# Patient Record
Sex: FEMALE | Race: WHITE | NOT HISPANIC OR LATINO | Employment: PART TIME | URBAN - METROPOLITAN AREA
[De-identification: names, ages, dates, MRNs, and addresses within clinical notes are randomized per-mention and may not be internally consistent; named-entity substitution may affect disease eponyms.]

---

## 2023-02-23 LAB
ALANINE AMINOTRANSFERASE (SGPT) (U/L) IN SER/PLAS: 24 U/L (ref 7–45)
ALBUMIN (G/DL) IN SER/PLAS: 4.4 G/DL (ref 3.4–5)
ALKALINE PHOSPHATASE (U/L) IN SER/PLAS: 54 U/L (ref 33–110)
ANION GAP IN SER/PLAS: 12 MMOL/L (ref 10–20)
APPEARANCE, URINE: CLEAR
ASPARTATE AMINOTRANSFERASE (SGOT) (U/L) IN SER/PLAS: 21 U/L (ref 9–39)
BASOPHILS (10*3/UL) IN BLOOD BY AUTOMATED COUNT: 0.05 X10E9/L (ref 0–0.1)
BASOPHILS/100 LEUKOCYTES IN BLOOD BY AUTOMATED COUNT: 0.7 % (ref 0–2)
BILIRUBIN TOTAL (MG/DL) IN SER/PLAS: 0.4 MG/DL (ref 0–1.2)
BILIRUBIN, URINE: NEGATIVE
BLOOD, URINE: NEGATIVE
CALCIDIOL (25 OH VITAMIN D3) (NG/ML) IN SER/PLAS: 51 NG/ML
CALCIUM (MG/DL) IN SER/PLAS: 9.1 MG/DL (ref 8.6–10.3)
CARBON DIOXIDE, TOTAL (MMOL/L) IN SER/PLAS: 28 MMOL/L (ref 21–32)
CHLORIDE (MMOL/L) IN SER/PLAS: 103 MMOL/L (ref 98–107)
CHOLESTEROL (MG/DL) IN SER/PLAS: 218 MG/DL (ref 0–199)
CHOLESTEROL IN HDL (MG/DL) IN SER/PLAS: 68 MG/DL
CHOLESTEROL/HDL RATIO: 3.2
COLOR, URINE: NORMAL
CREATININE (MG/DL) IN SER/PLAS: 0.71 MG/DL (ref 0.5–1.05)
EOSINOPHILS (10*3/UL) IN BLOOD BY AUTOMATED COUNT: 0.07 X10E9/L (ref 0–0.7)
EOSINOPHILS/100 LEUKOCYTES IN BLOOD BY AUTOMATED COUNT: 1 % (ref 0–6)
ERYTHROCYTE DISTRIBUTION WIDTH (RATIO) BY AUTOMATED COUNT: 13.3 % (ref 11.5–14.5)
ERYTHROCYTE MEAN CORPUSCULAR HEMOGLOBIN CONCENTRATION (G/DL) BY AUTOMATED: 33.6 G/DL (ref 32–36)
ERYTHROCYTE MEAN CORPUSCULAR VOLUME (FL) BY AUTOMATED COUNT: 95 FL (ref 80–100)
ERYTHROCYTES (10*6/UL) IN BLOOD BY AUTOMATED COUNT: 4.6 X10E12/L (ref 4–5.2)
GFR FEMALE: >90 ML/MIN/1.73M2
GLUCOSE (MG/DL) IN SER/PLAS: 78 MG/DL (ref 74–99)
GLUCOSE, URINE: NEGATIVE MG/DL
HEMATOCRIT (%) IN BLOOD BY AUTOMATED COUNT: 43.8 % (ref 36–46)
HEMOGLOBIN (G/DL) IN BLOOD: 14.7 G/DL (ref 12–16)
IMMATURE GRANULOCYTES/100 LEUKOCYTES IN BLOOD BY AUTOMATED COUNT: 0.4 % (ref 0–0.9)
KETONES, URINE: NEGATIVE MG/DL
LDL: 118 MG/DL (ref 0–99)
LEUKOCYTE ESTERASE, URINE: NEGATIVE
LEUKOCYTES (10*3/UL) IN BLOOD BY AUTOMATED COUNT: 6.8 X10E9/L (ref 4.4–11.3)
LYMPHOCYTES (10*3/UL) IN BLOOD BY AUTOMATED COUNT: 1.66 X10E9/L (ref 1.2–4.8)
LYMPHOCYTES/100 LEUKOCYTES IN BLOOD BY AUTOMATED COUNT: 24.4 % (ref 13–44)
MONOCYTES (10*3/UL) IN BLOOD BY AUTOMATED COUNT: 0.42 X10E9/L (ref 0.1–1)
MONOCYTES/100 LEUKOCYTES IN BLOOD BY AUTOMATED COUNT: 6.2 % (ref 2–10)
NEUTROPHILS (10*3/UL) IN BLOOD BY AUTOMATED COUNT: 4.57 X10E9/L (ref 1.2–7.7)
NEUTROPHILS/100 LEUKOCYTES IN BLOOD BY AUTOMATED COUNT: 67.3 % (ref 40–80)
NITRITE, URINE: NEGATIVE
PH, URINE: 7 (ref 5–8)
PLATELETS (10*3/UL) IN BLOOD AUTOMATED COUNT: 274 X10E9/L (ref 150–450)
POTASSIUM (MMOL/L) IN SER/PLAS: 4.1 MMOL/L (ref 3.5–5.3)
PROTEIN TOTAL: 7 G/DL (ref 6.4–8.2)
PROTEIN, URINE: NEGATIVE MG/DL
SODIUM (MMOL/L) IN SER/PLAS: 139 MMOL/L (ref 136–145)
SPECIFIC GRAVITY, URINE: 1.01 (ref 1–1.03)
THYROTROPIN (MIU/L) IN SER/PLAS BY DETECTION LIMIT <= 0.05 MIU/L: 0.96 MIU/L (ref 0.44–3.98)
TRIGLYCERIDE (MG/DL) IN SER/PLAS: 160 MG/DL (ref 0–149)
UREA NITROGEN (MG/DL) IN SER/PLAS: 15 MG/DL (ref 6–23)
UROBILINOGEN, URINE: <2 MG/DL (ref 0–1.9)
VLDL: 32 MG/DL (ref 0–40)

## 2023-11-15 ENCOUNTER — TELEMEDICINE (OUTPATIENT)
Dept: PRIMARY CARE | Facility: CLINIC | Age: 38
End: 2023-11-15
Payer: COMMERCIAL

## 2023-11-15 DIAGNOSIS — F98.8 ADD (ATTENTION DEFICIT DISORDER) WITHOUT HYPERACTIVITY: Primary | ICD-10-CM

## 2023-11-15 DIAGNOSIS — J06.9 VIRAL UPPER RESPIRATORY TRACT INFECTION: ICD-10-CM

## 2023-11-15 PROCEDURE — 99213 OFFICE O/P EST LOW 20 MIN: CPT | Performed by: FAMILY MEDICINE

## 2023-11-15 RX ORDER — OMEPRAZOLE 20 MG/1
20 TABLET, DELAYED RELEASE ORAL
COMMUNITY

## 2023-11-15 RX ORDER — DROSPIRENONE AND ESTRADIOL 1; .5 MG/1; MG/1
1 TABLET, FILM COATED ORAL
COMMUNITY
Start: 2023-11-15

## 2023-11-15 RX ORDER — DEXTROAMPHETAMINE SACCHARATE, AMPHETAMINE ASPARTATE, DEXTROAMPHETAMINE SULFATE AND AMPHETAMINE SULFATE 5; 5; 5; 5 MG/1; MG/1; MG/1; MG/1
20 TABLET ORAL 2 TIMES DAILY
COMMUNITY
End: 2023-11-15 | Stop reason: SDUPTHER

## 2023-11-15 RX ORDER — VALACYCLOVIR HYDROCHLORIDE 500 MG/1
1 TABLET, FILM COATED ORAL DAILY
COMMUNITY
Start: 2019-01-14

## 2023-11-15 RX ORDER — DEXTROAMPHETAMINE SACCHARATE, AMPHETAMINE ASPARTATE, DEXTROAMPHETAMINE SULFATE AND AMPHETAMINE SULFATE 5; 5; 5; 5 MG/1; MG/1; MG/1; MG/1
20 TABLET ORAL 2 TIMES DAILY
Qty: 60 TABLET | Refills: 0 | Status: SHIPPED | OUTPATIENT
Start: 2023-12-15 | End: 2024-01-26 | Stop reason: SDUPTHER

## 2023-11-15 RX ORDER — CLONAZEPAM 2 MG/1
2 TABLET, ORALLY DISINTEGRATING ORAL NIGHTLY PRN
COMMUNITY
Start: 2023-05-16 | End: 2024-01-26 | Stop reason: SDUPTHER

## 2023-11-15 RX ORDER — DEXTROAMPHETAMINE SACCHARATE, AMPHETAMINE ASPARTATE, DEXTROAMPHETAMINE SULFATE AND AMPHETAMINE SULFATE 5; 5; 5; 5 MG/1; MG/1; MG/1; MG/1
20 TABLET ORAL 2 TIMES DAILY
Qty: 60 TABLET | Refills: 0 | Status: SHIPPED | OUTPATIENT
Start: 2023-11-15

## 2023-11-15 RX ORDER — DEXTROAMPHETAMINE SACCHARATE, AMPHETAMINE ASPARTATE, DEXTROAMPHETAMINE SULFATE AND AMPHETAMINE SULFATE 5; 5; 5; 5 MG/1; MG/1; MG/1; MG/1
20 TABLET ORAL DAILY
Qty: 30 TABLET | Refills: 0 | Status: SHIPPED | OUTPATIENT
Start: 2024-01-15 | End: 2024-01-26 | Stop reason: SDUPTHER

## 2023-11-15 NOTE — PROGRESS NOTES
Subjective   Patient ID: Sarah Quintana is a 38 y.o. female who presents for No chief complaint on file..  13:27  13:47  Video:  20 minute video visit.    Rto 3 months or sooner if not better in 2 days.    PT Consented.   ILL TODAY.  SXS STARTED:  THIS AM.  OV TO VIDEO APPT.    NEEDS REFILLS.   STIMULANTS.    REPORTS BENEFIT ON MED, NO DIVERSION NO SFXs. NO CHANGES NEEDED.    COVID IS AT WORK.    ROS: CONGESTED HA AND CHEST PRESSURE, SMELL OFF AND TASTE IS OFF ALSO.          Review of Systems   All other systems reviewed and are negative.    Objective   Physical Exam  Constitutional:       Appearance: Normal appearance.      Comments: Video limits exam:  pt walks about her home well groomed and sniffle soften some dry cough.  No wheezes, full sentences.       Assessment/Plan   Diagnoses and all orders for this visit:  ADD (attention deficit disorder) without hyperactivity  -     amphetamine-dextroamphetamine (Adderall) 20 mg tablet; Take 1 tablet (20 mg) by mouth 2 times a day.  -     amphetamine-dextroamphetamine (Adderall) 20 mg tablet; Take 1 tablet (20 mg) by mouth 2 times a day. Do not start before December 15, 2023.  -     amphetamine-dextroamphetamine (Adderall) 20 mg tablet; Take 1 tablet (20 mg) by mouth once daily. Do not start before January 15, 2024.             .VS

## 2023-11-15 NOTE — PATIENT INSTRUCTIONS
Supportive care in recovery from illness.   Get covid tested.    Refills sent  Call fro concerns [phone].

## 2024-01-05 ENCOUNTER — TELEPHONE (OUTPATIENT)
Dept: PRIMARY CARE | Facility: CLINIC | Age: 39
End: 2024-01-05
Payer: COMMERCIAL

## 2024-01-05 DIAGNOSIS — L70.0 ACNE VULGARIS: Primary | ICD-10-CM

## 2024-01-05 RX ORDER — DOXYCYCLINE 100 MG/1
100 CAPSULE ORAL 2 TIMES DAILY
Qty: 60 CAPSULE | Refills: 0 | Status: SHIPPED | OUTPATIENT
Start: 2024-01-05 | End: 2024-02-04

## 2024-01-05 NOTE — TELEPHONE ENCOUNTER
PATIENT CALLED RX LINE REQUESTING REFILL OF DOXY 100MG TO BE SENT TO [facility]  This was your old si CAP by mouth twice a day with a glass of water; avoid sun exposure/TANNING BED WHILE  on this medication. 3 MONTHS ACNE TREATMENT.

## 2024-01-25 ENCOUNTER — TELEPHONE (OUTPATIENT)
Dept: PRIMARY CARE | Facility: CLINIC | Age: 39
End: 2024-01-25
Payer: COMMERCIAL

## 2024-01-25 DIAGNOSIS — F41.9 ANXIETY: ICD-10-CM

## 2024-01-25 DIAGNOSIS — F98.8 ADD (ATTENTION DEFICIT DISORDER) WITHOUT HYPERACTIVITY: Primary | ICD-10-CM

## 2024-01-25 NOTE — TELEPHONE ENCOUNTER
PATIENT CALLING IN NEEDING 30 MORE ADDERALL SENT IN SHE ONLY REC'D 30 FROM PHARMACY AND IS ALSO INQUIRING ABOUT A REFILL FOR KLONOPIN    1/26/24: 13:06 HRS: CONTROLLED RXS REFILL NEEDED.  OARRS:   ADDERALL 20 mg #30 FILLED 1/22/24. but #60 12/21/23  --will reorder at #60/30 day bid.   KLONOPIN 2 mg #60/20 days 12/21/23.    --WILL REFILL.   --PT NEEDS UDS.    --PT UPSET OVER DOSING ERRORS TOLD [facility] SHE WOULD GO ELSEWHERE, FINE. --PGS

## 2024-01-26 RX ORDER — DEXTROAMPHETAMINE SACCHARATE, AMPHETAMINE ASPARTATE, DEXTROAMPHETAMINE SULFATE AND AMPHETAMINE SULFATE 5; 5; 5; 5 MG/1; MG/1; MG/1; MG/1
20 TABLET ORAL 2 TIMES DAILY
Qty: 60 TABLET | Refills: 0 | Status: SHIPPED | OUTPATIENT
Start: 2024-01-26 | End: 2024-05-08 | Stop reason: ALTCHOICE

## 2024-01-26 RX ORDER — CLONAZEPAM 2 MG/1
2 TABLET, ORALLY DISINTEGRATING ORAL NIGHTLY PRN
Qty: 60 TABLET | Refills: 2 | Status: SHIPPED | OUTPATIENT
Start: 2024-01-26 | End: 2024-04-16 | Stop reason: SDUPTHER

## 2024-02-07 ENCOUNTER — LAB (OUTPATIENT)
Dept: LAB | Facility: LAB | Age: 39
End: 2024-02-07
Payer: COMMERCIAL

## 2024-02-07 ENCOUNTER — OFFICE VISIT (OUTPATIENT)
Dept: PRIMARY CARE | Facility: CLINIC | Age: 39
End: 2024-02-07
Payer: COMMERCIAL

## 2024-02-07 VITALS
DIASTOLIC BLOOD PRESSURE: 80 MMHG | HEART RATE: 84 BPM | WEIGHT: 165 LBS | TEMPERATURE: 98.4 F | RESPIRATION RATE: 16 BRPM | SYSTOLIC BLOOD PRESSURE: 122 MMHG

## 2024-02-07 DIAGNOSIS — Z79.899 LONG-TERM CURRENT USE OF STIMULANT: ICD-10-CM

## 2024-02-07 DIAGNOSIS — E55.9 VITAMIN D DEFICIENCY: ICD-10-CM

## 2024-02-07 DIAGNOSIS — Z79.899 LONG-TERM CURRENT USE OF BENZODIAZEPINE: ICD-10-CM

## 2024-02-07 DIAGNOSIS — F41.9 ANXIETY: ICD-10-CM

## 2024-02-07 DIAGNOSIS — F98.8 ADD (ATTENTION DEFICIT DISORDER) WITHOUT HYPERACTIVITY: Primary | ICD-10-CM

## 2024-02-07 DIAGNOSIS — F98.8 ADD (ATTENTION DEFICIT DISORDER) WITHOUT HYPERACTIVITY: ICD-10-CM

## 2024-02-07 LAB
ALBUMIN SERPL BCP-MCNC: 4.5 G/DL (ref 3.4–5)
ALP SERPL-CCNC: 73 U/L (ref 33–110)
ALT SERPL W P-5'-P-CCNC: 19 U/L (ref 7–45)
AMORPH CRY #/AREA UR COMP ASSIST: ABNORMAL /HPF
AMPHETAMINES UR QL SCN: ABNORMAL
ANION GAP SERPL CALC-SCNC: 16 MMOL/L (ref 10–20)
APPEARANCE UR: ABNORMAL
AST SERPL W P-5'-P-CCNC: 26 U/L (ref 9–39)
BARBITURATES UR QL SCN: ABNORMAL
BASOPHILS # BLD AUTO: 0.06 X10*3/UL (ref 0–0.1)
BASOPHILS NFR BLD AUTO: 0.8 %
BENZODIAZ UR QL SCN: ABNORMAL
BILIRUB SERPL-MCNC: 0.6 MG/DL (ref 0–1.2)
BILIRUB UR STRIP.AUTO-MCNC: NEGATIVE MG/DL
BUN SERPL-MCNC: 9 MG/DL (ref 6–23)
BZE UR QL SCN: ABNORMAL
CALCIUM SERPL-MCNC: 8.8 MG/DL (ref 8.6–10.3)
CANNABINOIDS UR QL SCN: ABNORMAL
CHLORIDE SERPL-SCNC: 103 MMOL/L (ref 98–107)
CHOLEST SERPL-MCNC: 239 MG/DL (ref 0–199)
CHOLESTEROL/HDL RATIO: 2.7
CO2 SERPL-SCNC: 24 MMOL/L (ref 21–32)
COLOR UR: YELLOW
CREAT SERPL-MCNC: 0.73 MG/DL (ref 0.5–1.05)
EGFRCR SERPLBLD CKD-EPI 2021: >90 ML/MIN/1.73M*2
EOSINOPHIL # BLD AUTO: 0.08 X10*3/UL (ref 0–0.7)
EOSINOPHIL NFR BLD AUTO: 1 %
ERYTHROCYTE [DISTWIDTH] IN BLOOD BY AUTOMATED COUNT: 14.4 % (ref 11.5–14.5)
FENTANYL+NORFENTANYL UR QL SCN: ABNORMAL
GLUCOSE SERPL-MCNC: 89 MG/DL (ref 74–99)
GLUCOSE UR STRIP.AUTO-MCNC: NEGATIVE MG/DL
HCT VFR BLD AUTO: 49.6 % (ref 36–46)
HDLC SERPL-MCNC: 88.4 MG/DL
HGB BLD-MCNC: 16.1 G/DL (ref 12–16)
IMM GRANULOCYTES # BLD AUTO: 0.02 X10*3/UL (ref 0–0.7)
IMM GRANULOCYTES NFR BLD AUTO: 0.3 % (ref 0–0.9)
KETONES UR STRIP.AUTO-MCNC: ABNORMAL MG/DL
LDLC SERPL CALC-MCNC: 111 MG/DL
LEUKOCYTE ESTERASE UR QL STRIP.AUTO: NEGATIVE
LYMPHOCYTES # BLD AUTO: 2.13 X10*3/UL (ref 1.2–4.8)
LYMPHOCYTES NFR BLD AUTO: 27.6 %
MCH RBC QN AUTO: 32.3 PG (ref 26–34)
MCHC RBC AUTO-ENTMCNC: 32.5 G/DL (ref 32–36)
MCV RBC AUTO: 100 FL (ref 80–100)
MONOCYTES # BLD AUTO: 0.69 X10*3/UL (ref 0.1–1)
MONOCYTES NFR BLD AUTO: 8.9 %
NEUTROPHILS # BLD AUTO: 4.75 X10*3/UL (ref 1.2–7.7)
NEUTROPHILS NFR BLD AUTO: 61.4 %
NITRITE UR QL STRIP.AUTO: NEGATIVE
NON HDL CHOLESTEROL: 151 MG/DL (ref 0–149)
NRBC BLD-RTO: 0 /100 WBCS (ref 0–0)
OPIATES UR QL SCN: ABNORMAL
OXYCODONE+OXYMORPHONE UR QL SCN: ABNORMAL
PCP UR QL SCN: ABNORMAL
PH UR STRIP.AUTO: 5 [PH]
PLATELET # BLD AUTO: 279 X10*3/UL (ref 150–450)
POTASSIUM SERPL-SCNC: 4.2 MMOL/L (ref 3.5–5.3)
PROT SERPL-MCNC: 7.3 G/DL (ref 6.4–8.2)
PROT UR STRIP.AUTO-MCNC: ABNORMAL MG/DL
RBC # BLD AUTO: 4.98 X10*6/UL (ref 4–5.2)
RBC # UR STRIP.AUTO: ABNORMAL /UL
RBC #/AREA URNS AUTO: ABNORMAL /HPF
SODIUM SERPL-SCNC: 139 MMOL/L (ref 136–145)
SP GR UR STRIP.AUTO: 1.02
SQUAMOUS #/AREA URNS AUTO: ABNORMAL /HPF
TRIGL SERPL-MCNC: 198 MG/DL (ref 0–149)
UROBILINOGEN UR STRIP.AUTO-MCNC: 2 MG/DL
VLDL: 40 MG/DL (ref 0–40)
WBC # BLD AUTO: 7.7 X10*3/UL (ref 4.4–11.3)
WBC #/AREA URNS AUTO: ABNORMAL /HPF

## 2024-02-07 PROCEDURE — 84443 ASSAY THYROID STIM HORMONE: CPT

## 2024-02-07 PROCEDURE — 80349 CANNABINOIDS NATURAL: CPT

## 2024-02-07 PROCEDURE — 81001 URINALYSIS AUTO W/SCOPE: CPT

## 2024-02-07 PROCEDURE — 80324 DRUG SCREEN AMPHETAMINES 1/2: CPT

## 2024-02-07 PROCEDURE — 82306 VITAMIN D 25 HYDROXY: CPT

## 2024-02-07 PROCEDURE — 36415 COLL VENOUS BLD VENIPUNCTURE: CPT

## 2024-02-07 PROCEDURE — 80307 DRUG TEST PRSMV CHEM ANLYZR: CPT

## 2024-02-07 PROCEDURE — 80061 LIPID PANEL: CPT

## 2024-02-07 PROCEDURE — 99213 OFFICE O/P EST LOW 20 MIN: CPT | Performed by: FAMILY MEDICINE

## 2024-02-07 PROCEDURE — 85025 COMPLETE CBC W/AUTO DIFF WBC: CPT

## 2024-02-07 PROCEDURE — 80053 COMPREHEN METABOLIC PANEL: CPT

## 2024-02-07 PROCEDURE — 80346 BENZODIAZEPINES1-12: CPT

## 2024-02-07 RX ORDER — PROGESTERONE 100 MG/1
1 CAPSULE ORAL NIGHTLY
COMMUNITY
Start: 2024-01-11

## 2024-02-07 RX ORDER — ESTRADIOL 0.5 MG/1
1 TABLET ORAL NIGHTLY
COMMUNITY
Start: 2024-01-11

## 2024-02-07 RX ORDER — DEXTROAMPHETAMINE SACCHARATE, AMPHETAMINE ASPARTATE, DEXTROAMPHETAMINE SULFATE AND AMPHETAMINE SULFATE 5; 5; 5; 5 MG/1; MG/1; MG/1; MG/1
20 TABLET ORAL 2 TIMES DAILY
Qty: 60 TABLET | Refills: 0 | Status: SHIPPED | OUTPATIENT
Start: 2024-04-07 | End: 2024-05-08 | Stop reason: ALTCHOICE

## 2024-02-07 RX ORDER — DEXTROAMPHETAMINE SACCHARATE, AMPHETAMINE ASPARTATE, DEXTROAMPHETAMINE SULFATE AND AMPHETAMINE SULFATE 5; 5; 5; 5 MG/1; MG/1; MG/1; MG/1
20 TABLET ORAL 2 TIMES DAILY
Qty: 60 TABLET | Refills: 0 | Status: SHIPPED | OUTPATIENT
Start: 2024-02-07 | End: 2024-05-08 | Stop reason: ALTCHOICE

## 2024-02-07 RX ORDER — DEXTROAMPHETAMINE SACCHARATE, AMPHETAMINE ASPARTATE, DEXTROAMPHETAMINE SULFATE AND AMPHETAMINE SULFATE 5; 5; 5; 5 MG/1; MG/1; MG/1; MG/1
20 TABLET ORAL 2 TIMES DAILY
Qty: 60 TABLET | Refills: 0 | Status: SHIPPED | OUTPATIENT
Start: 2024-03-08 | End: 2024-05-08 | Stop reason: ALTCHOICE

## 2024-02-07 NOTE — PROGRESS NOTES
Subjective   Patient ID: Sarah Quintana is a 39 y.o. female who presents for Med Refill and Follow-up (SPOT ON RT ANKLE  - ITCHY/ FLAKY ).  Med Refill      CONTROLLED MED REFILLS:  NOT NEEDING KLONOPIN.   NEEDS THE ADDERALL.    NEEDS UDS:   OARRS: REVIEWED #60/MONTH KLONOPIN AND D-AMPH 20 MG...  REPORTS BENEFIT ON MED   NO SFX  NO DIVERSION   NO CHANGES DESIRED.   GOES TO [facility] LATER TODAY FOR TO GET HER HORMONES INTO CONTROL.    S/P HYSTER AND ON HORMONE CONTROL.      Review of Systems   All other systems reviewed and are negative.      Objective   Physical Exam  Vitals and nursing note reviewed.   Constitutional:       Appearance: Normal appearance.   HENT:      Head: Normocephalic.      Right Ear: Tympanic membrane, ear canal and external ear normal.      Left Ear: Tympanic membrane, ear canal and external ear normal.      Nose: Nose normal.      Mouth/Throat:      Mouth: Mucous membranes are moist.   Eyes:      Extraocular Movements: Extraocular movements intact.      Conjunctiva/sclera: Conjunctivae normal.   Cardiovascular:      Rate and Rhythm: Normal rate and regular rhythm.      Pulses: Normal pulses.      Heart sounds: Normal heart sounds.   Pulmonary:      Effort: Pulmonary effort is normal.      Breath sounds: Normal breath sounds.   Abdominal:      General: Bowel sounds are normal.      Palpations: Abdomen is soft.   Musculoskeletal:         General: Normal range of motion.      Cervical back: Normal range of motion and neck supple.   Skin:     General: Skin is warm and dry.      Comments: Tan skin is clear well groomed   Neurological:      General: No focal deficit present.   Psychiatric:         Mood and Affect: Mood normal.         Assessment/Plan   Diagnoses and all orders for this visit:  ADD (attention deficit disorder) without hyperactivity  -     amphetamine-dextroamphetamine (Adderall) 20 mg tablet; Take 1 tablet (20 mg) by mouth 2 times a day.  -     amphetamine-dextroamphetamine (Adderall)  20 mg tablet; Take 1 tablet (20 mg) by mouth 2 times a day.  -     amphetamine-dextroamphetamine (Adderall) 20 mg tablet; Take 1 tablet (20 mg) by mouth 2 times a day. Do not start before March 8, 2024.  -     amphetamine-dextroamphetamine (Adderall) 20 mg tablet; Take 1 tablet (20 mg) by mouth 2 times a day. Do not start before April 7, 2024.  -     Drug Screen, Urine With Reflex to Confirmation; Future  -     Lipid panel; Future  -     Vitamin D 25-Hydroxy,Total (for eval of Vitamin D levels); Future  -     Urinalysis with Reflex Microscopic; Future  -     TSH with reflex to Free T4 if abnormal; Future  -     Comprehensive Metabolic Panel; Future  -     CBC and Auto Differential; Future  -     Follow Up In Primary Care - Established; Future  Anxiety  -     amphetamine-dextroamphetamine (Adderall) 20 mg tablet; Take 1 tablet (20 mg) by mouth 2 times a day.  -     amphetamine-dextroamphetamine (Adderall) 20 mg tablet; Take 1 tablet (20 mg) by mouth 2 times a day.  -     amphetamine-dextroamphetamine (Adderall) 20 mg tablet; Take 1 tablet (20 mg) by mouth 2 times a day. Do not start before March 8, 2024.  -     amphetamine-dextroamphetamine (Adderall) 20 mg tablet; Take 1 tablet (20 mg) by mouth 2 times a day. Do not start before April 7, 2024.  -     Drug Screen, Urine With Reflex to Confirmation; Future  -     Lipid panel; Future  -     Vitamin D 25-Hydroxy,Total (for eval of Vitamin D levels); Future  -     Urinalysis with Reflex Microscopic; Future  -     TSH with reflex to Free T4 if abnormal; Future  -     Comprehensive Metabolic Panel; Future  -     CBC and Auto Differential; Future  -     Follow Up In Primary Care - Established; Future  Long-term current use of stimulant  -     Drug Screen, Urine With Reflex to Confirmation; Future  -     Lipid panel; Future  -     Vitamin D 25-Hydroxy,Total (for eval of Vitamin D levels); Future  -     Urinalysis with Reflex Microscopic; Future  -     TSH with reflex to Free  T4 if abnormal; Future  -     Comprehensive Metabolic Panel; Future  -     CBC and Auto Differential; Future  -     Follow Up In Primary Care - Established; Future  Long-term current use of benzodiazepine  -     Drug Screen, Urine With Reflex to Confirmation; Future  -     Lipid panel; Future  -     Vitamin D 25-Hydroxy,Total (for eval of Vitamin D levels); Future  -     Urinalysis with Reflex Microscopic; Future  -     TSH with reflex to Free T4 if abnormal; Future  -     Comprehensive Metabolic Panel; Future  -     CBC and Auto Differential; Future  -     Follow Up In Primary Care - Established; Future  Vitamin D deficiency  -     Vitamin D 25-Hydroxy,Total (for eval of Vitamin D levels); Future  -     Follow Up In Primary Care - Established; Future             .VS

## 2024-02-08 LAB
25(OH)D3 SERPL-MCNC: 48 NG/ML (ref 30–100)
TSH SERPL-ACNC: 1.31 MIU/L (ref 0.44–3.98)

## 2024-02-09 ENCOUNTER — TELEPHONE (OUTPATIENT)
Dept: PRIMARY CARE | Facility: CLINIC | Age: 39
End: 2024-02-09
Payer: COMMERCIAL

## 2024-02-09 NOTE — TELEPHONE ENCOUNTER
----- Message from Dyllan Man MD sent at 2/8/2024  7:19 PM EST -----  UDS HAS THC [PT ADMITTED USING AND ON OARRS], AMPHETAMINE AND BENZOS.  APPROPRIATE.   SMOKER WITH ELEVATED HGB 16 THE OPPOSITE OF ANEMIA AND RISK OF CLOTS SO QUIT SMOKING. PLZ.   LIPIDS ARE UP NEEDS DIET CONTROLS AND STATIN.  ABNL UA YET DOES NOT LOOK LIKE UTI.    OK VIT D KEEP ON SUPPLEMENTS. OK TSH/CMP.

## 2024-02-09 NOTE — PROGRESS NOTES
Subjective   Patient ID: Sarah Quintana is a 39 y.o. female who presents for No chief complaint on file..  HPI    Review of Systems    Objective   Physical Exam    Assessment/Plan              .VS

## 2024-02-12 LAB
AMPHET UR-MCNC: >5000 NG/ML
CARBOXYTHC UR-MCNC: 380 NG/ML
MDA UR-MCNC: <200 NG/ML
MDEA UR-MCNC: <200 NG/ML
MDMA UR-MCNC: <200 NG/ML
METHAMPHET UR-MCNC: <200 NG/ML
PHENTERMINE UR CFM-MCNC: <200 NG/ML

## 2024-02-13 LAB
1OH-MIDAZOLAM UR CFM-MCNC: <25 NG/ML
7AMINOCLONAZEPAM UR CFM-MCNC: >1000 NG/ML
A-OH ALPRAZ UR CFM-MCNC: <25 NG/ML
ALPRAZ UR CFM-MCNC: <25 NG/ML
CHLORDIAZEP UR CFM-MCNC: <25 NG/ML
CLONAZEPAM UR CFM-MCNC: 37 NG/ML
DIAZEPAM UR CFM-MCNC: <25 NG/ML
LORAZEPAM UR CFM-MCNC: <25 NG/ML
MIDAZOLAM UR CFM-MCNC: <25 NG/ML
NORDIAZEPAM UR CFM-MCNC: <25 NG/ML
OXAZEPAM UR CFM-MCNC: <25 NG/ML
TEMAZEPAM UR CFM-MCNC: <25 NG/ML

## 2024-04-16 DIAGNOSIS — F41.9 ANXIETY: ICD-10-CM

## 2024-04-16 RX ORDER — CLONAZEPAM 2 MG/1
2 TABLET, ORALLY DISINTEGRATING ORAL NIGHTLY PRN
Qty: 60 TABLET | Refills: 2 | Status: SHIPPED | OUTPATIENT
Start: 2024-04-16 | End: 2024-05-08 | Stop reason: DRUGHIGH

## 2024-05-08 ENCOUNTER — OFFICE VISIT (OUTPATIENT)
Dept: PRIMARY CARE | Facility: CLINIC | Age: 39
End: 2024-05-08
Payer: COMMERCIAL

## 2024-05-08 VITALS
DIASTOLIC BLOOD PRESSURE: 84 MMHG | WEIGHT: 163 LBS | SYSTOLIC BLOOD PRESSURE: 124 MMHG | TEMPERATURE: 98.6 F | HEART RATE: 76 BPM | RESPIRATION RATE: 16 BRPM

## 2024-05-08 DIAGNOSIS — F98.8 ADD (ATTENTION DEFICIT DISORDER) WITHOUT HYPERACTIVITY: Primary | ICD-10-CM

## 2024-05-08 DIAGNOSIS — E55.9 VITAMIN D DEFICIENCY: ICD-10-CM

## 2024-05-08 DIAGNOSIS — Z79.899 LONG-TERM CURRENT USE OF BENZODIAZEPINE: ICD-10-CM

## 2024-05-08 DIAGNOSIS — Z79.899 LONG-TERM CURRENT USE OF STIMULANT: ICD-10-CM

## 2024-05-08 DIAGNOSIS — F41.9 ANXIETY: ICD-10-CM

## 2024-05-08 DIAGNOSIS — J30.2 SEASONAL ALLERGIC RHINITIS, UNSPECIFIED TRIGGER: ICD-10-CM

## 2024-05-08 DIAGNOSIS — Z79.899 HIGH RISK MEDICATION USE: ICD-10-CM

## 2024-05-08 DIAGNOSIS — F17.210 CIGARETTE NICOTINE DEPENDENCE WITHOUT COMPLICATION: ICD-10-CM

## 2024-05-08 PROCEDURE — 80349 CANNABINOIDS NATURAL: CPT

## 2024-05-08 PROCEDURE — 99214 OFFICE O/P EST MOD 30 MIN: CPT | Performed by: FAMILY MEDICINE

## 2024-05-08 PROCEDURE — 80307 DRUG TEST PRSMV CHEM ANLYZR: CPT

## 2024-05-08 RX ORDER — CLONAZEPAM 2 MG/1
2 TABLET ORAL DAILY PRN
Qty: 90 TABLET | Refills: 0 | Status: SHIPPED | OUTPATIENT
Start: 2024-05-08 | End: 2024-08-06

## 2024-05-08 RX ORDER — DEXTROAMPHETAMINE SACCHARATE, AMPHETAMINE ASPARTATE, DEXTROAMPHETAMINE SULFATE AND AMPHETAMINE SULFATE 5; 5; 5; 5 MG/1; MG/1; MG/1; MG/1
20 TABLET ORAL 2 TIMES DAILY
Qty: 60 TABLET | Refills: 0 | Status: SHIPPED | OUTPATIENT
Start: 2024-06-07 | End: 2024-07-07

## 2024-05-08 RX ORDER — NORETHINDRONE 0.35 MG/1
TABLET ORAL
COMMUNITY
Start: 2024-05-05

## 2024-05-08 RX ORDER — FLUTICASONE PROPIONATE 50 MCG
1 SPRAY, SUSPENSION (ML) NASAL DAILY
Qty: 16 G | Refills: 11 | Status: SHIPPED | OUTPATIENT
Start: 2024-05-08 | End: 2025-05-08

## 2024-05-08 RX ORDER — DEXTROAMPHETAMINE SACCHARATE, AMPHETAMINE ASPARTATE, DEXTROAMPHETAMINE SULFATE AND AMPHETAMINE SULFATE 5; 5; 5; 5 MG/1; MG/1; MG/1; MG/1
20 TABLET ORAL 2 TIMES DAILY
Qty: 60 TABLET | Refills: 0 | Status: SHIPPED | OUTPATIENT
Start: 2024-05-08 | End: 2024-06-07

## 2024-05-08 RX ORDER — DEXTROAMPHETAMINE SACCHARATE, AMPHETAMINE ASPARTATE, DEXTROAMPHETAMINE SULFATE AND AMPHETAMINE SULFATE 5; 5; 5; 5 MG/1; MG/1; MG/1; MG/1
20 TABLET ORAL 2 TIMES DAILY
Qty: 60 TABLET | Refills: 0 | Status: SHIPPED | OUTPATIENT
Start: 2024-07-07 | End: 2024-08-06

## 2024-05-08 RX ORDER — CETIRIZINE HYDROCHLORIDE 10 MG/1
10 TABLET ORAL DAILY
Qty: 30 TABLET | Refills: 2 | Status: SHIPPED | OUTPATIENT
Start: 2024-05-08 | End: 2024-06-03

## 2024-05-08 NOTE — PATIENT INSTRUCTIONS
USE MEDICATIONS WITH CAUTION.    CALL FOR CONCERNS: [phone].    USE MYCHART.   COMPLETE URINE DRUG SCREENING.    REDUCE AND QUIT SMOKING:  SURROGATES ARE AVAILABLE.

## 2024-05-08 NOTE — PROGRESS NOTES
"Subjective   Patient ID: Sarah Quintana is a 39 y.o. female who presents for Follow-up (3 month follow up /Allergie flare up ).  HPI  Allergy attacks x 2 dayas snd red eyes and voice changes   No otc yet :  try flonase and zyrtec.    Needs refills  NEEDS UDS  KLONOPIN MELTS ARE NOT EFFECTIVE FOR PT WANTS 90 DAYS OF NON MELT.   MENOPAUSE SXS.  \"KLONOPIN IS CRUCIAL TO HELP ME STAY ASLEEP.\"       Review of Systems   All other systems reviewed and are negative.      Objective   Physical Exam  Vitals and nursing note reviewed.   Constitutional:       Appearance: Normal appearance.   HENT:      Head: Normocephalic and atraumatic.      Right Ear: Tympanic membrane, ear canal and external ear normal.      Left Ear: Tympanic membrane, ear canal and external ear normal.      Nose: Nose normal.      Mouth/Throat:      Mouth: Mucous membranes are moist.      Pharynx: Oropharynx is clear.   Eyes:      Extraocular Movements: Extraocular movements intact.      Conjunctiva/sclera: Conjunctivae normal.   Cardiovascular:      Rate and Rhythm: Normal rate and regular rhythm.      Pulses: Normal pulses.      Heart sounds: Normal heart sounds.   Pulmonary:      Effort: Pulmonary effort is normal.      Breath sounds: Normal breath sounds.   Abdominal:      General: Bowel sounds are normal.      Palpations: Abdomen is soft.   Musculoskeletal:         General: Normal range of motion.      Cervical back: Normal range of motion and neck supple.   Skin:     General: Skin is warm and dry.      Comments: Tan skin throughout smells of tobacco   Neurological:      General: No focal deficit present.      Mental Status: She is oriented to person, place, and time. Mental status is at baseline.   Psychiatric:         Mood and Affect: Mood normal.         Behavior: Behavior normal.         Thought Content: Thought content normal.         Judgment: Judgment normal.         Assessment/Plan   Diagnoses and all orders for this visit:  ADD (attention " deficit disorder) without hyperactivity  -     Follow Up In Primary Care - Established  -     Drug Screen, Urine With Reflex to Confirmation; Future  -     amphetamine-dextroamphetamine (Adderall) 20 mg tablet; Take 1 tablet (20 mg) by mouth 2 times a day.  -     amphetamine-dextroamphetamine (Adderall) 20 mg tablet; Take 1 tablet (20 mg) by mouth 2 times a day. Do not fill before June 7, 2024.  -     amphetamine-dextroamphetamine (Adderall) 20 mg tablet; Take 1 tablet (20 mg) by mouth 2 times a day. Do not fill before July 7, 2024.  Anxiety  -     Follow Up In Primary Care - Established  -     clonazePAM (KlonoPIN) 2 mg tablet; Take 1 tablet (2 mg) by mouth once daily as needed for seizures.  Long-term current use of stimulant  -     Follow Up In Primary Care - Established  Long-term current use of benzodiazepine  -     Follow Up In Primary Care - Established  Vitamin D deficiency  -     Follow Up In Primary Care - Established  Seasonal allergic rhinitis, unspecified trigger  -     fluticasone (Flonase) 50 mcg/actuation nasal spray; Administer 1 spray into each nostril once daily. Shake gently. Before first use, prime pump. After use, clean tip and replace cap.  -     cetirizine (ZyrTEC) 10 mg tablet; Take 1 tablet (10 mg) by mouth once daily.  High risk medication use  -     Drug Screen, Urine With Reflex to Confirmation; Future  -     amphetamine-dextroamphetamine (Adderall) 20 mg tablet; Take 1 tablet (20 mg) by mouth 2 times a day.  -     amphetamine-dextroamphetamine (Adderall) 20 mg tablet; Take 1 tablet (20 mg) by mouth 2 times a day. Do not fill before June 7, 2024.  -     amphetamine-dextroamphetamine (Adderall) 20 mg tablet; Take 1 tablet (20 mg) by mouth 2 times a day. Do not fill before July 7, 2024.

## 2024-05-09 LAB
AMPHETAMINES UR QL SCN: ABNORMAL
BARBITURATES UR QL SCN: ABNORMAL
BENZODIAZ UR QL SCN: ABNORMAL
BZE UR QL SCN: ABNORMAL
CANNABINOIDS UR QL SCN: ABNORMAL
FENTANYL+NORFENTANYL UR QL SCN: ABNORMAL
METHADONE UR QL SCN: ABNORMAL
OPIATES UR QL SCN: ABNORMAL
OXYCODONE+OXYMORPHONE UR QL SCN: ABNORMAL
PCP UR QL SCN: ABNORMAL

## 2024-05-11 LAB — CARBOXYTHC UR-MCNC: 86 NG/ML

## 2024-05-13 DIAGNOSIS — F41.9 ANXIETY: ICD-10-CM

## 2024-05-13 DIAGNOSIS — F12.90 MARIJUANA USE: ICD-10-CM

## 2024-05-13 DIAGNOSIS — Z79.899 HIGH RISK MEDICATION USE: Primary | ICD-10-CM

## 2024-05-13 DIAGNOSIS — Z79.899 LONG-TERM CURRENT USE OF BENZODIAZEPINE: ICD-10-CM

## 2024-05-13 DIAGNOSIS — F98.8 ADD (ATTENTION DEFICIT DISORDER) WITHOUT HYPERACTIVITY: ICD-10-CM

## 2024-05-13 DIAGNOSIS — Z79.899 LONG-TERM CURRENT USE OF STIMULANT: ICD-10-CM

## 2024-05-13 NOTE — RESULT ENCOUNTER NOTE
INAPPROPRIATE UDS:  NO AMPH, NO BENZO.  ALWAYS +THC.    LAST OARRS:  LAST FILL FOR CONTROLLED MEDS BEFORE THE UDS:   AMPH: 5/8; 4/3; CLONAZEPAM: 5/8 & 4/16.   1--COMPLEX PT w/ SEDATIVE AND STIMULANT CO THERAPY AND CONCERNS FOR DIVERSION AND ALSO TAKING THC CHRONICALLY.    2--REFER TO PSYCHIATRY.    3--MAY NEED TO DISMISS PT: HAS BEEN ABUSIVE AND DEMANDING IN PAST AND NOT APPROPRIATE.  NOT TO BE SCHEDULED WITH DR HARMON.

## 2024-05-14 ENCOUNTER — TELEPHONE (OUTPATIENT)
Dept: PRIMARY CARE | Facility: CLINIC | Age: 39
End: 2024-05-14
Payer: COMMERCIAL

## 2024-05-14 NOTE — TELEPHONE ENCOUNTER
----- Message from Dyllan Man MD sent at 5/13/2024  1:23 PM EDT -----  INAPPROPRIATE UDS:  NO AMPH, NO BENZO.  ALWAYS +THC.    LAST OARRS:  LAST FILL FOR CONTROLLED MEDS BEFORE THE UDS:   AMPH: 5/8; 4/3; CLONAZEPAM: 5/8 & 4/16.   1--COMPLEX PT w/ SEDATIVE AND STIMULANT CO THERAPY AND CONCERNS FOR DIVERSION AND ALSO TAKING THC CHRONICALLY.    2--REFER TO PSYCHIATRY.    3--MAY NEED TO DISMISS PT: HAS BEEN ABUSIVE AND DEMANDING IN PAST AND NOT APPROPRIATE.  NOT TO BE SCHEDULED WITH DR HARMON.

## 2024-05-15 NOTE — TELEPHONE ENCOUNTER
I spoke to patient and she said she sets up a 7 day container and always takes her pills.  She took the Klonipin at night and takes the Adderall as directed.  She is adament that she takes it.  Can she redo a UDS?  She said she can't function if she doesn't take med.  Not happy about referral.

## 2024-05-16 NOTE — TELEPHONE ENCOUNTER
ALICIA - Spoke to patient again.  Told her Dr. Man will no longer handle these medications.  Psych called and told her they were scheduling months out and gave her another office to call.

## 2024-06-03 DIAGNOSIS — J30.2 SEASONAL ALLERGIC RHINITIS, UNSPECIFIED TRIGGER: ICD-10-CM

## 2024-06-03 RX ORDER — CETIRIZINE HYDROCHLORIDE 10 MG/1
10 TABLET ORAL DAILY
Qty: 90 TABLET | Refills: 1 | Status: SHIPPED | OUTPATIENT
Start: 2024-06-03

## 2024-07-17 ENCOUNTER — TELEPHONE (OUTPATIENT)
Dept: PRIMARY CARE | Facility: CLINIC | Age: 39
End: 2024-07-17
Payer: COMMERCIAL

## 2024-07-17 NOTE — TELEPHONE ENCOUNTER
Patient called unsure of what to do...  She has been trying to get in to Psychiatry ([facility] [phone]) and was told soonest would be December and they want proof of ADHD & Biopolar.  They recommended she go to Neurology for testing.  Neurology office told her to call PCP and wants to know exactly what she needs to be seen for.  She told them it was for medication management. (See message from 05/16/24)  She wants to know what to do because she will be due for her refills around August 7th.

## 2024-07-18 NOTE — TELEPHONE ENCOUNTER
LMOM for patient with details.  I noted on the 08/07/24 visit to do the ADHD and Bipolar testing.  Stated for patient to call back if any questions.

## 2024-08-07 ENCOUNTER — APPOINTMENT (OUTPATIENT)
Dept: PRIMARY CARE | Facility: CLINIC | Age: 39
End: 2024-08-07
Payer: COMMERCIAL

## 2024-08-07 VITALS
HEART RATE: 78 BPM | WEIGHT: 163 LBS | RESPIRATION RATE: 16 BRPM | DIASTOLIC BLOOD PRESSURE: 80 MMHG | SYSTOLIC BLOOD PRESSURE: 122 MMHG | TEMPERATURE: 97.6 F

## 2024-08-07 DIAGNOSIS — Z79.899 LONG-TERM CURRENT USE OF STIMULANT: ICD-10-CM

## 2024-08-07 DIAGNOSIS — F98.8 ADD (ATTENTION DEFICIT DISORDER) WITHOUT HYPERACTIVITY: Primary | ICD-10-CM

## 2024-08-07 DIAGNOSIS — Z79.899 HIGH RISK MEDICATION USE: ICD-10-CM

## 2024-08-07 DIAGNOSIS — F41.9 ANXIETY: ICD-10-CM

## 2024-08-07 DIAGNOSIS — Z79.899 LONG-TERM CURRENT USE OF BENZODIAZEPINE: ICD-10-CM

## 2024-08-07 PROCEDURE — 99214 OFFICE O/P EST MOD 30 MIN: CPT | Performed by: FAMILY MEDICINE

## 2024-08-07 RX ORDER — DEXTROAMPHETAMINE SACCHARATE, AMPHETAMINE ASPARTATE, DEXTROAMPHETAMINE SULFATE AND AMPHETAMINE SULFATE 5; 5; 5; 5 MG/1; MG/1; MG/1; MG/1
20 TABLET ORAL 2 TIMES DAILY
Qty: 60 TABLET | Refills: 0 | Status: SHIPPED | OUTPATIENT
Start: 2024-09-06 | End: 2024-10-06

## 2024-08-07 RX ORDER — DEXTROAMPHETAMINE SACCHARATE, AMPHETAMINE ASPARTATE, DEXTROAMPHETAMINE SULFATE AND AMPHETAMINE SULFATE 5; 5; 5; 5 MG/1; MG/1; MG/1; MG/1
20 TABLET ORAL 2 TIMES DAILY
Qty: 60 TABLET | Refills: 0 | Status: SHIPPED | OUTPATIENT
Start: 2024-08-07 | End: 2024-09-06

## 2024-08-07 RX ORDER — DEXTROAMPHETAMINE SACCHARATE, AMPHETAMINE ASPARTATE, DEXTROAMPHETAMINE SULFATE AND AMPHETAMINE SULFATE 5; 5; 5; 5 MG/1; MG/1; MG/1; MG/1
20 TABLET ORAL 2 TIMES DAILY
Qty: 60 TABLET | Refills: 0 | Status: SHIPPED | OUTPATIENT
Start: 2024-10-06 | End: 2024-11-05

## 2024-08-07 RX ORDER — CLONAZEPAM 2 MG/1
2 TABLET ORAL DAILY PRN
Qty: 90 TABLET | Refills: 0 | Status: SHIPPED | OUTPATIENT
Start: 2024-08-07 | End: 2024-11-05

## 2024-08-07 NOTE — PATIENT INSTRUCTIONS
USE [employer].  CALL FOR NEEDS [phone].   KEEP ON MEDICATIONS  KEEP SPECIALTY APPOINTMENTS.     Smoking: PLEASE REDUCE USE OR QUIT PLEASE.

## 2024-08-07 NOTE — PROGRESS NOTES
Subjective   Patient ID: Sarah Quintana is a 39 y.o. female who presents for Follow-up (Discuss referral and medications ).  HPI  Follow-up (Discuss referral and medications ).  UDS INAPPROPRIATE.  SEVERAL TIMES.      INAPPROPRIATE UDS:  NO AMPH, NO BENZO.  ALWAYS +THC.    LAST OARRS:  LAST FILL FOR CONTROLLED MEDS BEFORE THE UDS:  AMPH: 5/8; 4/3; CLONAZEPAM: 5/8 & 4/16.  1--COMPLEX PT w/ SEDATIVE AND STIMULANT CO THERAPY AND CONCERNS FOR DIVERSION AND ALSO TAKING THC CH ...    POLYCYTHEMIA HGB 16.1:  PLEASE REDUCE USE OR QUIT PLEASE.      HERE FOR REFILLS OF STIMULANTS:    INTERVAL:   REPORTS BENEFIT ON MED.   SFX:  NO  DIVERSION: NO  CHANGES NEEDED: NO  OARRS:   UDS: tba    PT IS WAIT LISTED FOR PSYCHIATRIST.    DEC 2024.      POLYPHARMACY ISSUES CONCERN DR SIDDIQUI.      SMOKING  ETOH USE  THC USE  PT AT TIMES  OVERLY DEMANDING.    APOLOGETIC TODAY.      Review of Systems    Objective   Physical Exam    Assessment/Plan   Diagnoses and all orders for this visit:  ADD (attention deficit disorder) without hyperactivity  -     Follow Up In Primary Care - Established  -     amphetamine-dextroamphetamine (Adderall) 20 mg tablet; Take 1 tablet (20 mg) by mouth 2 times a day.  -     amphetamine-dextroamphetamine (Adderall) 20 mg tablet; Take 1 tablet (20 mg) by mouth 2 times a day. Do not fill before September 6, 2024.  -     amphetamine-dextroamphetamine (Adderall) 20 mg tablet; Take 1 tablet (20 mg) by mouth 2 times a day. Do not fill before October 6, 2024.  Anxiety  -     Follow Up In Primary Care - Established  Long-term current use of stimulant  -     Follow Up In Primary Care - Established  Long-term current use of benzodiazepine  -     Follow Up In Primary Care - Established  High risk medication use  -     Follow Up In Primary Care - Established             .VS

## 2024-09-17 ENCOUNTER — APPOINTMENT (OUTPATIENT)
Dept: PRIMARY CARE | Facility: CLINIC | Age: 39
End: 2024-09-17
Payer: COMMERCIAL

## 2024-11-06 ENCOUNTER — APPOINTMENT (OUTPATIENT)
Dept: PRIMARY CARE | Facility: CLINIC | Age: 39
End: 2024-11-06
Payer: COMMERCIAL

## 2024-11-07 ENCOUNTER — OFFICE VISIT (OUTPATIENT)
Dept: PRIMARY CARE | Facility: CLINIC | Age: 39
End: 2024-11-07
Payer: COMMERCIAL

## 2024-11-07 ENCOUNTER — TELEPHONE (OUTPATIENT)
Dept: PRIMARY CARE | Facility: CLINIC | Age: 39
End: 2024-11-07

## 2024-11-07 NOTE — TELEPHONE ENCOUNTER
amphetamine-dextroamphetamine (Adderall) 20 mg tablet   clonazePAM (KlonoPIN) 2 mg tablet  Pt was 25 mins late for appt.   She will be in tomorrow    [facility] [address]

## 2024-11-08 ENCOUNTER — APPOINTMENT (OUTPATIENT)
Dept: PRIMARY CARE | Facility: CLINIC | Age: 39
End: 2024-11-08
Payer: COMMERCIAL

## 2024-11-08 NOTE — PATIENT INSTRUCTIONS
USE [employer].  CALL FOR NEEDS [phone].   KEEP ON MEDICATIONS  KEEP SPECIALTY APPOINTMENTS.

## 2024-11-08 NOTE — PROGRESS NOTES
Subjective   Patient ID: Sarah Quintana is a 39 y.o. female who presents for No chief complaint on file..    HPI   PT CANCELLED TODAY AND APPT CANCELLED YESTERDAY.  ? RUNNING LATE?  PT AND OR PCP?   I AM TRYING TO CONVERT TO PHONE VISIT NOW 19:34 HRS ON 07 NOV 2024.      Virtual or Telephone Consent [phone] (     [facility] 19:36 HRS.        {Complete for a virtual or telephone visit:60764}  {Select who provided consent:70704}        HERE FOR REFILLS OF STIMULANTS:    INTERVAL:   ABD AND PACK PAINS TO ER 10/31/24, [place] LABS AND CT ABD PELVIS.    REPORTS BENEFIT ON MED:  YES OR NO?   SFX:    DIVERSION:   CHANGES NEEDED:   OARRS:   PERCs 5/325 #9 BENITO ROBINS.      D-AMPH IR 20 mg BID WRITTEN 8/7/24 FILLED: 10/6 & 9/7 & 8/7/2024    CLONAZEPAM 2 MG WRITTEN & FILLED  8/7/24 #90.        UDS: REPEAT tba  MAY 2024 THC ONLY ON UDS.  ? INAPPROPRIATE?      APPT:    ON:   OUT:   DURATION:    RTO:     EXAM:     Review of Systems    Objective   There were no vitals taken for this visit.    Physical Exam    Assessment/Plan   {Assess/PlanSmartLinks:74457}

## 2024-11-12 ENCOUNTER — APPOINTMENT (OUTPATIENT)
Dept: PRIMARY CARE | Facility: CLINIC | Age: 39
End: 2024-11-12
Payer: COMMERCIAL

## 2024-11-12 DIAGNOSIS — F98.8 ADD (ATTENTION DEFICIT DISORDER) WITHOUT HYPERACTIVITY: Primary | ICD-10-CM

## 2024-11-12 DIAGNOSIS — Z11.59 ENCOUNTER FOR HCV SCREENING TEST FOR LOW RISK PATIENT: ICD-10-CM

## 2024-11-12 DIAGNOSIS — Z79.899 LONG-TERM CURRENT USE OF BENZODIAZEPINE: ICD-10-CM

## 2024-11-12 DIAGNOSIS — F41.9 ANXIETY: ICD-10-CM

## 2024-11-12 DIAGNOSIS — Z11.4 SCREENING FOR HIV (HUMAN IMMUNODEFICIENCY VIRUS): ICD-10-CM

## 2024-11-12 DIAGNOSIS — Z79.899 LONG-TERM CURRENT USE OF STIMULANT: ICD-10-CM

## 2024-11-12 DIAGNOSIS — Z79.899 HIGH RISK MEDICATION USE: ICD-10-CM

## 2024-11-12 PROCEDURE — 99214 OFFICE O/P EST MOD 30 MIN: CPT | Performed by: FAMILY MEDICINE

## 2024-11-12 RX ORDER — DEXTROAMPHETAMINE SACCHARATE, AMPHETAMINE ASPARTATE, DEXTROAMPHETAMINE SULFATE AND AMPHETAMINE SULFATE 5; 5; 5; 5 MG/1; MG/1; MG/1; MG/1
20 TABLET ORAL 2 TIMES DAILY
Qty: 60 TABLET | Refills: 0 | Status: SHIPPED | OUTPATIENT
Start: 2024-11-12 | End: 2024-12-12

## 2024-11-12 RX ORDER — CLONAZEPAM 2 MG/1
2 TABLET ORAL DAILY PRN
Qty: 90 TABLET | Refills: 0 | Status: SHIPPED | OUTPATIENT
Start: 2024-11-12 | End: 2025-02-10

## 2024-11-12 RX ORDER — DEXTROAMPHETAMINE SACCHARATE, AMPHETAMINE ASPARTATE, DEXTROAMPHETAMINE SULFATE AND AMPHETAMINE SULFATE 5; 5; 5; 5 MG/1; MG/1; MG/1; MG/1
20 TABLET ORAL 2 TIMES DAILY
Qty: 60 TABLET | Refills: 0 | Status: SHIPPED | OUTPATIENT
Start: 2024-12-12 | End: 2025-01-11

## 2024-11-12 RX ORDER — DEXTROAMPHETAMINE SACCHARATE, AMPHETAMINE ASPARTATE, DEXTROAMPHETAMINE SULFATE AND AMPHETAMINE SULFATE 5; 5; 5; 5 MG/1; MG/1; MG/1; MG/1
20 TABLET ORAL 2 TIMES DAILY
Qty: 60 TABLET | Refills: 0 | Status: SHIPPED | OUTPATIENT
Start: 2025-01-11 | End: 2025-02-10

## 2024-11-12 NOTE — PATIENT INSTRUCTIONS
USE [employer]  CALL FOR NEEDS [phone].   KEEP ON MEDICATIONS  KEEP SPECIALTY APPOINTMENTS.    Refills sent for adderall 20 and clonazepam.    PLEASE GO GET COVID TESTED.    Get fasted labs in jan to feb 2025.

## 2025-02-12 ENCOUNTER — APPOINTMENT (OUTPATIENT)
Dept: PRIMARY CARE | Facility: CLINIC | Age: 40
End: 2025-02-12
Payer: COMMERCIAL

## 2025-02-12 VITALS
RESPIRATION RATE: 16 BRPM | DIASTOLIC BLOOD PRESSURE: 91 MMHG | HEART RATE: 94 BPM | HEIGHT: 63 IN | BODY MASS INDEX: 30.12 KG/M2 | WEIGHT: 170 LBS | SYSTOLIC BLOOD PRESSURE: 134 MMHG

## 2025-02-12 DIAGNOSIS — F98.8 ADD (ATTENTION DEFICIT DISORDER) WITHOUT HYPERACTIVITY: ICD-10-CM

## 2025-02-12 DIAGNOSIS — E55.9 VITAMIN D DEFICIENCY: ICD-10-CM

## 2025-02-12 DIAGNOSIS — F41.9 ANXIETY: ICD-10-CM

## 2025-02-12 DIAGNOSIS — L81.9 CHANGE IN PIGMENTED SKIN LESION OF FACE: ICD-10-CM

## 2025-02-12 DIAGNOSIS — F17.200 TOBACCO DEPENDENCY: ICD-10-CM

## 2025-02-12 DIAGNOSIS — Z79.899 LONG-TERM CURRENT USE OF STIMULANT: Primary | ICD-10-CM

## 2025-02-12 PROCEDURE — 99214 OFFICE O/P EST MOD 30 MIN: CPT | Performed by: FAMILY MEDICINE

## 2025-02-12 PROCEDURE — G2211 COMPLEX E/M VISIT ADD ON: HCPCS | Performed by: FAMILY MEDICINE

## 2025-02-12 PROCEDURE — 3008F BODY MASS INDEX DOCD: CPT | Performed by: FAMILY MEDICINE

## 2025-02-12 RX ORDER — CLONAZEPAM 2 MG/1
2 TABLET ORAL DAILY PRN
Qty: 90 TABLET | Refills: 0 | Status: SHIPPED | OUTPATIENT
Start: 2025-02-12 | End: 2025-05-13

## 2025-02-12 RX ORDER — DEXTROAMPHETAMINE SACCHARATE, AMPHETAMINE ASPARTATE, DEXTROAMPHETAMINE SULFATE AND AMPHETAMINE SULFATE 5; 5; 5; 5 MG/1; MG/1; MG/1; MG/1
20 TABLET ORAL 2 TIMES DAILY
Qty: 60 TABLET | Refills: 0 | Status: SHIPPED | OUTPATIENT
Start: 2025-02-12 | End: 2025-03-14

## 2025-02-12 RX ORDER — NICOTINE POLACRILEX 4 MG/1
4 LOZENGE ORAL EVERY 2 HOUR PRN
Qty: 30 LOZENGE | Refills: 1 | Status: SHIPPED | OUTPATIENT
Start: 2025-02-12 | End: 2025-03-14

## 2025-02-12 RX ORDER — DEXTROAMPHETAMINE SACCHARATE, AMPHETAMINE ASPARTATE, DEXTROAMPHETAMINE SULFATE AND AMPHETAMINE SULFATE 5; 5; 5; 5 MG/1; MG/1; MG/1; MG/1
20 TABLET ORAL 2 TIMES DAILY
Qty: 60 TABLET | Refills: 0 | Status: SHIPPED | OUTPATIENT
Start: 2025-03-12 | End: 2025-04-11

## 2025-02-12 RX ORDER — DIPHENHYDRAMINE HCL 25 MG
4 CAPSULE ORAL AS NEEDED
Qty: 100 EACH | Refills: 0 | Status: SHIPPED | OUTPATIENT
Start: 2025-02-12 | End: 2025-03-14

## 2025-02-12 RX ORDER — DEXTROAMPHETAMINE SACCHARATE, AMPHETAMINE ASPARTATE, DEXTROAMPHETAMINE SULFATE AND AMPHETAMINE SULFATE 5; 5; 5; 5 MG/1; MG/1; MG/1; MG/1
20 TABLET ORAL 2 TIMES DAILY
Qty: 60 TABLET | Refills: 0 | Status: SHIPPED | OUTPATIENT
Start: 2025-04-12 | End: 2025-05-12

## 2025-02-12 NOTE — PATIENT INSTRUCTIONS
USE [employer].  CALL FOR NEEDS [phone].   KEEP ON MEDICATIONS  KEEP SPECIALTY APPOINTMENTS.    for skin changes FACE AND LT AXILLAE CALL DERMATOLOGY DR BRYCE GRAY [phone]     KEEP ZERO NICOTINE VAP IN USE TO HELP QUIT.    CALL 1 [phone] QUIT NOW      Quitting Smoking    Quitting smoking is the most important step you can take to improve your health. We're glad you have set a goal to improve your health.    Quit Smoking Resources    In addition to medications, use the STAR plan to help you successfully quit.   Stick with your quit date!   Tell friends, family, and coworkers your quit date. Request their understanding and support.  Anticipate and prepare for challenges. Some examples are withdrawal symptoms, being around others who smoke, and drinking alcohol.  Remove all tobacco products and paraphernalia from your environment. Make your home and vehicles smoke-free.    Free resources for additional support:  National tobacco quitline: 1-800-QUIT-NOW (1-636.742.4380).  SmokefreeTXT is a free text program to assist you in quitting. Visit https://www.smokefree.gov/smokefreetxt for more information.  Feel free to call your care manager at ([phone]) for additional support.

## 2025-02-12 NOTE — ASSESSMENT & PLAN NOTE
Orders:    clonazePAM (KlonoPIN) 2 mg tablet; Take 1 tablet (2 mg) by mouth once daily as needed for seizures.    Drug Screen, Urine With Reflex to Confirmation; Future    Lipid Panel; Future    Urinalysis with Reflex Microscopic; Future    TSH with reflex to Free T4 if abnormal; Future    Comprehensive Metabolic Panel; Future    CBC and Auto Differential; Future

## 2025-02-12 NOTE — PROGRESS NOTES
"Subjective   Patient ID: Sarah Quintana is a 40 y.o. female who presents for Follow-up.    HPI   HERE FOR REFILLS   MOM WAS MURDERED.    HAS ANOTHER APPT AT 1 PM.    TALK TX STARTED ON INTERVAL.  HELPS PT.    MAKING LISTS HELPS CONTROL HER SXS OF FEELING OVERWHELMED.        HERE FOR REFILLS OF STIMULANTS:    INTERVAL: SEE ABOVE.    REPORTS BENEFIT ON MED.   SFX:  NO  DIVERSION: NO  CHANGES NEEDED: NO  OARRS:   UDS: tba ?     SPOT ON FACE FOR EXAMINATION.  RT CHEEK TRANSVERSE ASYMMETRIC HYPERPIGMENTED LESION WITH ORANGE PEEL SURROUNDING SKIN. SKIN TAG MEDIAL LEFT UPPER ARM.        Review of Systems   All other systems reviewed and are negative.    SINUS CONGESTION.      Objective   BP (!) 134/91   Pulse 94   Resp 16   Ht 1.6 m (5' 3\")   Wt 77.1 kg (170 lb)   BMI 30.11 kg/m²     Physical Exam  Vitals and nursing note reviewed.   Constitutional:       Appearance: Normal appearance.   HENT:      Head: Normocephalic.      Right Ear: Tympanic membrane, ear canal and external ear normal.      Left Ear: Tympanic membrane, ear canal and external ear normal.      Nose: Nose normal.      Mouth/Throat:      Mouth: Mucous membranes are moist.      Pharynx: Oropharynx is clear.   Eyes:      Conjunctiva/sclera: Conjunctivae normal.      Pupils: Pupils are equal, round, and reactive to light.   Cardiovascular:      Rate and Rhythm: Normal rate and regular rhythm.      Pulses: Normal pulses.      Heart sounds: Normal heart sounds.   Pulmonary:      Effort: Pulmonary effort is normal.      Breath sounds: Normal breath sounds.   Abdominal:      General: Bowel sounds are normal.      Palpations: Abdomen is soft.   Musculoskeletal:         General: Normal range of motion.      Cervical back: Normal range of motion and neck supple.   Skin:     General: Skin is warm and dry.      Comments: ABNL SKIN BELOW LOWER RT EYELID ONTO THE RT CHEEK, ORANGE PEEL IRREG SYMMETRY & COLOR.     Neurological:      General: No focal deficit present. "      Mental Status: Mental status is at baseline.   Psychiatric:         Mood and Affect: Mood normal.         Behavior: Behavior normal.         Thought Content: Thought content normal.         Judgment: Judgment normal.         Assessment/Plan   Assessment & Plan  ADD (attention deficit disorder) without hyperactivity    Orders:    Follow Up In Primary Care - Established    amphetamine-dextroamphetamine (Adderall) 20 mg tablet; Take 1 tablet (20 mg) by mouth 2 times a day.    amphetamine-dextroamphetamine (Adderall) 20 mg tablet; Take 1 tablet (20 mg) by mouth 2 times a day. Do not fill before March 12, 2025.    amphetamine-dextroamphetamine (Adderall) 20 mg tablet; Take 1 tablet (20 mg) by mouth 2 times a day. Do not fill before April 12, 2025.    Drug Screen, Urine With Reflex to Confirmation; Future    Lipid Panel; Future    Urinalysis with Reflex Microscopic; Future    TSH with reflex to Free T4 if abnormal; Future    Comprehensive Metabolic Panel; Future    CBC and Auto Differential; Future    Anxiety    Orders:    clonazePAM (KlonoPIN) 2 mg tablet; Take 1 tablet (2 mg) by mouth once daily as needed for seizures.    Drug Screen, Urine With Reflex to Confirmation; Future    Lipid Panel; Future    Urinalysis with Reflex Microscopic; Future    TSH with reflex to Free T4 if abnormal; Future    Comprehensive Metabolic Panel; Future    CBC and Auto Differential; Future    Long-term current use of stimulant    Orders:    Drug Screen, Urine With Reflex to Confirmation; Future    Lipid Panel; Future    Urinalysis with Reflex Microscopic; Future    TSH with reflex to Free T4 if abnormal; Future    Comprehensive Metabolic Panel; Future    CBC and Auto Differential; Future    Follow Up In Primary Care - Established; Future    Vitamin D deficiency    Orders:    Vitamin D 25-Hydroxy,Total (for eval of Vitamin D levels); Future    Change in pigmented skin lesion of face    Orders:    Referral to Dermatology    Follow Up In  Primary Care - Health Maintenance; Future    Tobacco dependency    Orders:    nicotine polacrilex (Commit) 4 mg lozenge; Use 1 lozenge (4 mg) in the mouth or throat every 2 hours if needed for smoking cessation.    nicotine polacrilex (Nicorette) 4 mg gum; Chew 1 each (4 mg) if needed for smoking cessation.    Follow Up In Primary Care - Health Maintenance; Future           Tobacco Counseling  The patient smokes cigarettes and desires to quit.  We created the following quit plan:  Reviewed STAR protocol and added to patient instructions.

## 2025-05-16 ENCOUNTER — OFFICE VISIT (OUTPATIENT)
Dept: PRIMARY CARE | Facility: CLINIC | Age: 40
End: 2025-05-16
Payer: COMMERCIAL

## 2025-05-16 DIAGNOSIS — J30.2 SEASONAL ALLERGIC RHINITIS, UNSPECIFIED TRIGGER: ICD-10-CM

## 2025-05-16 DIAGNOSIS — F41.9 ANXIETY: ICD-10-CM

## 2025-05-16 DIAGNOSIS — Z79.899 HIGH RISK MEDICATION USE: ICD-10-CM

## 2025-05-16 DIAGNOSIS — Z11.4 SCREENING FOR HIV (HUMAN IMMUNODEFICIENCY VIRUS): ICD-10-CM

## 2025-05-16 DIAGNOSIS — F98.8 ADD (ATTENTION DEFICIT DISORDER) WITHOUT HYPERACTIVITY: Primary | ICD-10-CM

## 2025-05-16 PROCEDURE — 99213 OFFICE O/P EST LOW 20 MIN: CPT | Performed by: FAMILY MEDICINE

## 2025-05-16 RX ORDER — CETIRIZINE HYDROCHLORIDE 10 MG/1
10 TABLET ORAL DAILY
Qty: 90 TABLET | Refills: 1 | Status: SHIPPED | OUTPATIENT
Start: 2025-05-16

## 2025-05-16 RX ORDER — DEXTROAMPHETAMINE SACCHARATE, AMPHETAMINE ASPARTATE, DEXTROAMPHETAMINE SULFATE AND AMPHETAMINE SULFATE 5; 5; 5; 5 MG/1; MG/1; MG/1; MG/1
20 TABLET ORAL 2 TIMES DAILY
Qty: 60 TABLET | Refills: 0 | Status: SHIPPED | OUTPATIENT
Start: 2025-07-16 | End: 2025-08-15

## 2025-05-16 RX ORDER — DEXTROAMPHETAMINE SACCHARATE, AMPHETAMINE ASPARTATE, DEXTROAMPHETAMINE SULFATE AND AMPHETAMINE SULFATE 5; 5; 5; 5 MG/1; MG/1; MG/1; MG/1
20 TABLET ORAL 2 TIMES DAILY
Qty: 60 TABLET | Refills: 0 | Status: SHIPPED | OUTPATIENT
Start: 2025-05-16

## 2025-05-16 RX ORDER — CLONAZEPAM 2 MG/1
2 TABLET ORAL DAILY PRN
Qty: 90 TABLET | Refills: 0 | Status: SHIPPED | OUTPATIENT
Start: 2025-05-16 | End: 2025-08-14

## 2025-05-16 RX ORDER — DEXTROAMPHETAMINE SACCHARATE, AMPHETAMINE ASPARTATE, DEXTROAMPHETAMINE SULFATE AND AMPHETAMINE SULFATE 5; 5; 5; 5 MG/1; MG/1; MG/1; MG/1
20 TABLET ORAL 2 TIMES DAILY
Qty: 60 TABLET | Refills: 0 | Status: SHIPPED | OUTPATIENT
Start: 2025-06-16 | End: 2025-07-16

## 2025-05-16 NOTE — ASSESSMENT & PLAN NOTE
Orders:    clonazePAM (KlonoPIN) 2 mg tablet; Take 1 tablet (2 mg) by mouth once daily as needed for seizures.    Drug Screen, Urine With Reflex to Confirmation; Future    Hepatitis C antibody; Future    HIV 1/2 Antigen/Antibody Screen with Reflex to Confirmation; Future    Vitamin D 25-Hydroxy,Total (for eval of Vitamin D levels); Future    Urinalysis with Reflex Microscopic; Future    TSH with reflex to Free T4 if abnormal; Future    Comprehensive Metabolic Panel; Future    CBC and Auto Differential; Future    Lipid Panel; Future

## 2025-05-16 NOTE — PROGRESS NOTES
Subjective   Patient ID: Sraah Quintana is a 40 y.o. female who presents for No chief complaint on file..    HPI   Virtual or Telephone Consent    An interactive audio and video telecommunication system which permits real time communications between the patient (at the originating site) and provider (at the distant site) was utilized to provide this telehealth service.   Verbal consent was requested and obtained from Sarah Quintana on this date, 05/16/25 for a telehealth visit and the patient's location was confirmed at the time of the visit.        HERE FOR REFILLS OF STIMULANTS:    ALSO NEEDS REFILL OF BENZOs.   PT REFUSING NICOTINE CESSATION.    DOES NOT WANT TO HAVE A STROKE OR THE PATCHES.    OK   SMOKES ONLY A LITTLE AND FEELS THJIS SI GOOD ENOUGH.      NEEDS ONE REFILL ON THE CLONOPIN AND IT LAST 90 DAYS.     INTERVAL:   REPORTS BENEFIT ON MED.   SFX:  NO  DIVERSION: NO  CHANGES NEEDED: NO  OARRS:   UDS: tba    APPT:  1320  ON: 1351  OUT: 1403  DURATION:  12  RTO:  3M     EXAM: LIMITED VIRTUAL EXAM, APPROPRIATE.        Emergency hyster has hot flushes and needs GYN SPECIALIST AND USES HOMEOPATHIC AND ALLERGY TO ESTRADIOL AND  NEEDS HELP.      Review of Systems    Objective   There were no vitals taken for this visit.    Physical Exam    Assessment/Plan   Assessment & Plan  ADD (attention deficit disorder) without hyperactivity    Orders:    amphetamine-dextroamphetamine (Adderall) 20 mg tablet; Take 1 tablet (20 mg) by mouth 2 times a day.    amphetamine-dextroamphetamine (Adderall) 20 mg tablet; Take 1 tablet (20 mg) by mouth 2 times a day. Do not fill before June 16, 2025.    amphetamine-dextroamphetamine (Adderall) 20 mg tablet; Take 1 tablet (20 mg) by mouth 2 times a day. Do not fill before July 16, 2025.    Drug Screen, Urine With Reflex to Confirmation; Future    Hepatitis C antibody; Future    HIV 1/2 Antigen/Antibody Screen with Reflex to Confirmation; Future    Vitamin D 25-Hydroxy,Total (for  eval of Vitamin D levels); Future    Urinalysis with Reflex Microscopic; Future    TSH with reflex to Free T4 if abnormal; Future    Comprehensive Metabolic Panel; Future    CBC and Auto Differential; Future    Lipid Panel; Future    Follow Up In Primary Care - Established; Future    Anxiety    Orders:    clonazePAM (KlonoPIN) 2 mg tablet; Take 1 tablet (2 mg) by mouth once daily as needed for seizures.    Drug Screen, Urine With Reflex to Confirmation; Future    Hepatitis C antibody; Future    HIV 1/2 Antigen/Antibody Screen with Reflex to Confirmation; Future    Vitamin D 25-Hydroxy,Total (for eval of Vitamin D levels); Future    Urinalysis with Reflex Microscopic; Future    TSH with reflex to Free T4 if abnormal; Future    Comprehensive Metabolic Panel; Future    CBC and Auto Differential; Future    Lipid Panel; Future    Seasonal allergic rhinitis, unspecified trigger    Orders:    cetirizine (ZyrTEC) 10 mg tablet; Take 1 tablet (10 mg) by mouth once daily.    Drug Screen, Urine With Reflex to Confirmation; Future    Hepatitis C antibody; Future    HIV 1/2 Antigen/Antibody Screen with Reflex to Confirmation; Future    Vitamin D 25-Hydroxy,Total (for eval of Vitamin D levels); Future    Urinalysis with Reflex Microscopic; Future    TSH with reflex to Free T4 if abnormal; Future    Comprehensive Metabolic Panel; Future    CBC and Auto Differential; Future    Lipid Panel; Future    High risk medication use    Orders:    Drug Screen, Urine With Reflex to Confirmation; Future    Hepatitis C antibody; Future    HIV 1/2 Antigen/Antibody Screen with Reflex to Confirmation; Future    Vitamin D 25-Hydroxy,Total (for eval of Vitamin D levels); Future    Urinalysis with Reflex Microscopic; Future    TSH with reflex to Free T4 if abnormal; Future    Comprehensive Metabolic Panel; Future    CBC and Auto Differential; Future    Lipid Panel; Future    Screening for HIV (human immunodeficiency virus)    Orders:    Hepatitis C  antibody; Future    HIV 1/2 Antigen/Antibody Screen with Reflex to Confirmation; Future    Vitamin D 25-Hydroxy,Total (for eval of Vitamin D levels); Future    Urinalysis with Reflex Microscopic; Future    TSH with reflex to Free T4 if abnormal; Future    Comprehensive Metabolic Panel; Future    CBC and Auto Differential; Future    Lipid Panel; Future

## 2025-05-16 NOTE — PATIENT INSTRUCTIONS
USE [employer].  CALL FOR NEEDS [phone].   KEEP ON MEDICATIONS  KEEP SPECIALTY APPOINTMENTS.    REFILLED KLONOPIN AND ADDERALL.    Get fasted labs when able.

## 2025-07-21 ENCOUNTER — PATIENT OUTREACH (OUTPATIENT)
Dept: PRIMARY CARE | Facility: CLINIC | Age: 40
End: 2025-07-21
Payer: COMMERCIAL

## 2025-07-21 RX ORDER — AMOXICILLIN AND CLAVULANATE POTASSIUM 875; 125 MG/1; MG/1
1 TABLET, FILM COATED ORAL
COMMUNITY
Start: 2025-07-15

## 2025-07-21 RX ORDER — NAPROXEN 500 MG/1
TABLET ORAL
COMMUNITY
Start: 2025-07-15

## 2025-07-21 NOTE — PROGRESS NOTES
Discharge Facility: [facility]  Discharge Diagnosis: Cat Bite  Admission Date: 7/16/25  Discharge Date: 7/18/25    PCP Appointment Date: Pt will call and schedule. Task to office.   Specialist Appointment Date: n/a  Hospital Encounter and Summary Linked: Yes    Hospital Encounter    See discharge assessment below for further details    Wrap Up  Wrap Up Additional Comments: This CM spoke with pt via phone. Pt reports doing well at home since discharge. New meds reviewed. Pt denies CP and SOB. Pt aware of my availability for non-emergent concerns. Contact info provided to patient (7/21/2025  8:23 AM)    Engagement  Call Start Time: 0821 (7/21/2025  8:23 AM)    Medications  Medications reviewed with patient/caregiver?: Yes (7/21/2025  8:23 AM)  Is the patient having any side effects they believe may be caused by any medication additions or changes?: No (7/21/2025  8:23 AM)  Does the patient have all medications ordered at discharge?: Yes (7/21/2025  8:23 AM)  Care Management Interventions: No intervention needed (7/21/2025  8:23 AM)  Prescription Comments: pt sent home with script (7/21/2025  8:23 AM)  Is the patient taking all medications as directed (includes completed medication regime)?: Yes (7/21/2025  8:23 AM)  Care Management Interventions: Provided patient education (7/21/2025  8:23 AM)  Medication Comments: Pt denies problems obtaining or affording medication (7/21/2025  8:23 AM)    Appointments  Does the patient have a primary care provider?: Yes (pt will call to schedule appt. task to office.) (7/21/2025  8:23 AM)  Care Management Interventions: Educated patient on importance of making appointment (7/21/2025  8:23 AM)  Has the patient kept scheduled appointments due by today?: Yes (7/21/2025  8:23 AM)  Care Management Interventions: Educated on importance of keeping appointment (7/21/2025  8:23 AM)    Self Management  What is the home health agency?: n/a (7/21/2025  8:23 AM)  Has home health visited the  patient within 72 hours of discharge?: Not applicable (7/21/2025  8:23 AM)    Patient Teaching  Does the patient have access to their discharge instructions?: Yes (7/21/2025  8:23 AM)  Care Management Interventions: Reviewed instructions with patient (7/21/2025  8:23 AM)  What is the patient's perception of their health status since discharge?: Improving (7/21/2025  8:23 AM)  Is the patient/caregiver able to teach back the hierarchy of who to call/visit for symptoms/problems? PCP, Specialist, Home Health nurse, Urgent Care, ED, 911: Yes (7/21/2025  8:23 AM)      Maximus Villegas LPN

## 2025-07-31 ENCOUNTER — APPOINTMENT (OUTPATIENT)
Dept: PRIMARY CARE | Facility: CLINIC | Age: 40
End: 2025-07-31
Payer: COMMERCIAL

## 2025-07-31 ENCOUNTER — PATIENT OUTREACH (OUTPATIENT)
Dept: PRIMARY CARE | Facility: CLINIC | Age: 40
End: 2025-07-31

## 2025-07-31 VITALS
DIASTOLIC BLOOD PRESSURE: 87 MMHG | WEIGHT: 169 LBS | RESPIRATION RATE: 16 BRPM | HEIGHT: 63 IN | HEART RATE: 64 BPM | SYSTOLIC BLOOD PRESSURE: 131 MMHG | BODY MASS INDEX: 29.95 KG/M2

## 2025-07-31 DIAGNOSIS — F98.8 ADD (ATTENTION DEFICIT DISORDER) WITHOUT HYPERACTIVITY: ICD-10-CM

## 2025-07-31 DIAGNOSIS — F17.200 TOBACCO DEPENDENCY: ICD-10-CM

## 2025-07-31 DIAGNOSIS — Z90.710 POST HYSTERECTOMY MENOPAUSE: ICD-10-CM
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
## 2025-07-31 DIAGNOSIS — F41.9 ANXIETY: ICD-10-CM

## 2025-07-31 DIAGNOSIS — Z79.899 HIGH RISK MEDICATION USE: ICD-10-CM

## 2025-07-31 DIAGNOSIS — E89.40 POST HYSTERECTOMY MENOPAUSE: ICD-10-CM

## 2025-07-31 DIAGNOSIS — E55.9 VITAMIN D DEFICIENCY: ICD-10-CM

## 2025-07-31 PROBLEM — S61.459A CAT BITE OF HAND, INITIAL ENCOUNTER: Status: ACTIVE | Noted: 2019-10-23

## 2025-07-31 PROBLEM — N89.3 VAGINAL DYSPLASIA: Status: ACTIVE | Noted: 2020-03-13

## 2025-07-31 PROBLEM — R87.613 HIGH GRADE SQUAMOUS INTRAEPITHELIAL CERVICAL DYSPLASIA: Status: ACTIVE | Noted: 2019-07-02
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
## 2025-07-31 PROBLEM — K57.92 DIVERTICULITIS: Status: ACTIVE | Noted: 2019-12-02

## 2025-07-31 PROBLEM — N94.10 DYSPAREUNIA, FEMALE: Status: ACTIVE | Noted: 2019-07-02

## 2025-07-31 PROBLEM — M54.50 LOW BACK PAIN, UNSPECIFIED: Status: ACTIVE | Noted: 2024-11-05

## 2025-07-31 PROBLEM — G43.909 MIGRAINE: Status: ACTIVE | Noted: 2025-07-31

## 2025-07-31 PROCEDURE — 99214 OFFICE O/P EST MOD 30 MIN: CPT | Performed by: FAMILY MEDICINE

## 2025-07-31 PROCEDURE — 3008F BODY MASS INDEX DOCD: CPT | Performed by: FAMILY MEDICINE

## 2025-07-31 RX ORDER — DEXTROAMPHETAMINE SACCHARATE, AMPHETAMINE ASPARTATE, DEXTROAMPHETAMINE SULFATE AND AMPHETAMINE SULFATE 5; 5; 5; 5 MG/1; MG/1; MG/1; MG/1
20 TABLET ORAL 2 TIMES DAILY
Qty: 60 TABLET | Refills: 0 | Status: SHIPPED | OUTPATIENT
Start: 2025-08-31 | End: 2025-09-30

## 2025-07-31 RX ORDER — CLONAZEPAM 2 MG/1
2 TABLET ORAL DAILY PRN
Qty: 90 TABLET | Refills: 0 | Status: SHIPPED | OUTPATIENT
Start: 2025-07-31 | End: 2025-10-29

## 2025-07-31 RX ORDER — DEXTROAMPHETAMINE SACCHARATE, AMPHETAMINE ASPARTATE, DEXTROAMPHETAMINE SULFATE AND AMPHETAMINE SULFATE 5; 5; 5; 5 MG/1; MG/1; MG/1; MG/1
20 TABLET ORAL 2 TIMES DAILY
Qty: 60 TABLET | Refills: 0 | Status: SHIPPED | OUTPATIENT
Start: 2025-07-31 | End: 2025-08-30

## 2025-07-31 RX ORDER — METHYLCELLULOSE (WITH SUGAR)
1 POWDER IN PACKET (EA) ORAL DAILY
Qty: 850 G | Refills: 3 | Status: SHIPPED | OUTPATIENT
Start: 2025-07-31 | End: 2026-07-31

## 2025-07-31 NOTE — PROGRESS NOTES
"Subjective   Patient ID: Sarah Quintana is a 40 y.o. female who presents for Hospital Follow-up ([facility] 7/16/25 ) and Med Refill.    Med Refill       [facility]: Discharge Dx: Cat Bite: 7/16-18 JULY 2025    Hospital Follow-up ([facility] 7/16/25 ) and Med Refill.  MOVING HER FRIEND FELIPE PETTED THE CAT AND BITTEN AND SWELLING AND TO ER.  5 DAYS INPT. #3 DIFFERENT ABX.    INFECTIOUS DISEASE CONSULTANT :  LOW WBC COUNTS WERE CONCERNING TO SPECIALIST.    TETANUS DONE  AUGMENTIN HOME GOING  ? CONCERN FOR UNDERLYING AUTOIMMUNE ISSUE.      MRI UPPER EXTREMITY:  7.21.2025    S/P COMPLETE HYSTER FOR ABNL PAP CERVICAL CANCER AND FAILED ESTRADIOL THERAPY FOR MENOPAUSE 2/2 VTG.  YET MONOTHERAPY WOULD NOT BE APPROPRIATE.  GYN:  ?       Review of Systems  \"SICK ALL THE TIME MY BODY SHUTS DOWN.\"  JOINT PAINS   HYSTER 6 YRS AGO.  ? MENOPAUSE.    C-SPINE BUMP CAUSING HER PAIN - CHIRO ADJUSTS HER  HERE  W SPOUSE.   FATIGUED FROM WORK   PLEASE QUIT SMOKING.        GYN RETIRED: DR MCDUFFIE REFILLS ESTRADIOL PT DOES NOT TAKE IT 90 DAY APRIL 2025.     GYN  [address] 2ND OPINION WANTED LABS $150 MONTHLY FOR TREATMENT SURVEYS.          Objective   /87   Pulse 64   Resp 16   Ht 1.6 m (5' 3\")   Wt 76.7 kg (169 lb)   BMI 29.94 kg/m²     Physical Exam  Vitals and nursing note reviewed.   Constitutional:       Appearance: Normal appearance.      Comments: CORD LIKE VEIN VOLAR RT FOREARM ? CEPHALIC VEIN.  SCAB AT RT PALM AND FAINT MORALES FROM THE DORSAL RT HAND WEB SPACE WOUNDS   HENT:      Head: Normocephalic.      Right Ear: Tympanic membrane, ear canal and external ear normal.      Left Ear: Tympanic membrane, ear canal and external ear normal.      Nose: Nose normal.      Mouth/Throat:      Mouth: Mucous membranes are moist.      Pharynx: Oropharynx is clear.     Eyes:      Conjunctiva/sclera: Conjunctivae normal.      Pupils: Pupils are equal, round, and reactive to light.       Cardiovascular:      Rate and Rhythm: Normal rate and regular " rhythm.      Pulses: Normal pulses.      Heart sounds: Normal heart sounds.   Pulmonary:      Effort: Pulmonary effort is normal.      Breath sounds: Normal breath sounds.   Abdominal:      General: Bowel sounds are normal.      Palpations: Abdomen is soft.     Musculoskeletal:         General: Normal range of motion.      Cervical back: Normal range of motion and neck supple.      Comments: C7 IS PROMINENT AND TAN TATTOOED SKIN      Skin:     General: Skin is warm and dry.     Neurological:      General: No focal deficit present.      Mental Status: Mental status is at baseline.     Psychiatric:         Mood and Affect: Mood normal.         Behavior: Behavior normal.         Thought Content: Thought content normal.         Judgment: Judgment normal.         Assessment/Plan   Assessment & Plan  Vitamin D deficiency    Orders:    CBC and Auto Differential; Future    Comprehensive metabolic panel; Future    FSH & LH; Future    Sedimentation Rate; Future    C-reactive protein; Future    TSH; Future    methylcellulose (CitruceL, sucrose,) oral powder; Take 1 packet by mouth once daily.    Tobacco dependency    Orders:    CBC and Auto Differential; Future    Comprehensive metabolic panel; Future    FSH & LH; Future    Sedimentation Rate; Future    C-reactive protein; Future    TSH; Future    methylcellulose (CitruceL, sucrose,) oral powder; Take 1 packet by mouth once daily.    Cat bite, subsequent encounter    Orders:    CBC and Auto Differential; Future    Comprehensive metabolic panel; Future    FSH & LH; Future    Sedimentation Rate; Future    C-reactive protein; Future    TSH; Future    methylcellulose (CitruceL, sucrose,) oral powder; Take 1 packet by mouth once daily.    Follow Up In Primary Care - Established; Future    Anxiety    Orders:    clonazePAM (KlonoPIN) 2 mg tablet; Take 1 tablet (2 mg) by mouth once daily as needed for seizures.    Estrogens, Total; Future    DHEA-Sulfate; Future    ADD (attention  deficit disorder) without hyperactivity    Orders:    amphetamine-dextroamphetamine (Adderall) 20 mg tablet; Take 1 tablet (20 mg) by mouth 2 times a day.    amphetamine-dextroamphetamine (Adderall) 20 mg tablet; Take 1 tablet (20 mg) by mouth 2 times a day. Do not fill before August 31, 2025.    Follow Up In Primary Care - Established; Future    High risk medication use    Orders:    Estrogens, Total; Future    DHEA-Sulfate; Future    Post hysterectomy menopause    Orders:    Estrogens, Total; Future    DHEA-Sulfate; Future

## 2025-07-31 NOTE — ASSESSMENT & PLAN NOTE
Orders:    clonazePAM (KlonoPIN) 2 mg tablet; Take 1 tablet (2 mg) by mouth once daily as needed for seizures.    Estrogens, Total; Future    DHEA-Sulfate; Future

## 2025-07-31 NOTE — PROGRESS NOTES
Unable to reach patient for follow up call after recent hospitalization.   Left voicemail with call back number for patient to call if needed.    If no voicemail available call attempts x 2 were made to contact the patient to assist with any questions or concerns patient may have.     Maximus Villegas LPN

## 2025-07-31 NOTE — PATIENT INSTRUCTIONS
USE [employer].  CALL FOR NEEDS [phone].   KEEP ON MEDICATIONS  KEEP SPECIALTY APPOINTMENTS.      Get non-fasted labs now  Start daily to twice daily fiber supplements  Caution with over the counter therapies:  they can get expensive but it they help you go for it.    Refilled adderall.

## 2025-08-01 ENCOUNTER — RESULTS FOLLOW-UP (OUTPATIENT)
Dept: PRIMARY CARE | Facility: CLINIC | Age: 40
End: 2025-08-01
Payer: COMMERCIAL

## 2025-08-01 LAB
ALBUMIN SERPL-MCNC: 4.8 G/DL (ref 3.6–5.1)
ALP SERPL-CCNC: 82 U/L (ref 31–125)
ALT SERPL-CCNC: 22 U/L (ref 6–29)
ANION GAP SERPL CALCULATED.4IONS-SCNC: 11 MMOL/L (CALC) (ref 7–17)
AST SERPL-CCNC: 19 U/L (ref 10–30)
BASOPHILS # BLD AUTO: 62 CELLS/UL (ref 0–200)
BASOPHILS NFR BLD AUTO: 0.7 %
BILIRUB SERPL-MCNC: 0.3 MG/DL (ref 0.2–1.2)
BUN SERPL-MCNC: 17 MG/DL (ref 7–25)
CALCIUM SERPL-MCNC: 9.7 MG/DL (ref 8.6–10.2)
CHLORIDE SERPL-SCNC: 102 MMOL/L (ref 98–110)
CO2 SERPL-SCNC: 27 MMOL/L (ref 20–32)
CREAT SERPL-MCNC: 0.74 MG/DL (ref 0.5–0.97)
CRP SERPL-MCNC: <3 MG/L
DHEA-S SERPL-MCNC: 287 MCG/DL (ref 19–237)
EGFRCR SERPLBLD CKD-EPI 2021: 106 ML/MIN/1.73M2
EOSINOPHIL # BLD AUTO: 238 CELLS/UL (ref 15–500)
EOSINOPHIL NFR BLD AUTO: 2.7 %
ERYTHROCYTE [DISTWIDTH] IN BLOOD BY AUTOMATED COUNT: 13.3 % (ref 11–15)
ERYTHROCYTE [SEDIMENTATION RATE] IN BLOOD BY WESTERGREN METHOD: 6 MM/H
ESTROGEN SERPL-MCNC: NORMAL PG/ML
FSH SERPL-ACNC: 96.2 MIU/ML
GLUCOSE SERPL-MCNC: 97 MG/DL (ref 65–99)
HCT VFR BLD AUTO: 45.2 % (ref 35–45)
HGB BLD-MCNC: 15 G/DL (ref 11.7–15.5)
LH SERPL-ACNC: 37.5 MIU/ML
LYMPHOCYTES # BLD AUTO: 2147 CELLS/UL (ref 850–3900)
LYMPHOCYTES NFR BLD AUTO: 24.4 %
MCH RBC QN AUTO: 32.2 PG (ref 27–33)
MCHC RBC AUTO-ENTMCNC: 33.2 G/DL (ref 32–36)
MCV RBC AUTO: 97 FL (ref 80–100)
MONOCYTES # BLD AUTO: 607 CELLS/UL (ref 200–950)
MONOCYTES NFR BLD AUTO: 6.9 %
NEUTROPHILS # BLD AUTO: 5746 CELLS/UL (ref 1500–7800)
NEUTROPHILS NFR BLD AUTO: 65.3 %
PLATELET # BLD AUTO: 333 THOUSAND/UL (ref 140–400)
PMV BLD REES-ECKER: 9.1 FL (ref 7.5–12.5)
POTASSIUM SERPL-SCNC: 4.2 MMOL/L (ref 3.5–5.3)
PROT SERPL-MCNC: 7.6 G/DL (ref 6.1–8.1)
RBC # BLD AUTO: 4.66 MILLION/UL (ref 3.8–5.1)
SODIUM SERPL-SCNC: 140 MMOL/L (ref 135–146)
TSH SERPL-ACNC: 1.64 MIU/L
WBC # BLD AUTO: 8.8 THOUSAND/UL (ref 3.8–10.8)

## 2025-08-01 NOTE — RESULT ENCOUNTER NOTE
OK CBC DIFF,TSH, CHEMISTRIES, INFLAMMATORY MARKERS NL ESR/CRP.    STEROID LEVELS:  DHEA SULFATE 287 []. IF THIS IS AN ADRENAL ISSUE CAN DO ADRENAL ULTRASOUND  AS EXPECTED: FSH/LH ARE ELEVATED IN MENOPAUSE.  ? ADRENAL DYSFUNCTION TO EXPLAIN PT FATIGUE?    ONE LAB PENDING:  estrogens.   PGS CALLS PT 1846 HRS: [facility] - SAME AS YESTERDAY.

## 2025-08-05 ENCOUNTER — TELEPHONE (OUTPATIENT)
Dept: PRIMARY CARE | Facility: CLINIC | Age: 40
End: 2025-08-05
Payer: COMMERCIAL

## 2025-08-06 LAB
ALBUMIN SERPL-MCNC: 4.8 G/DL (ref 3.6–5.1)
ALP SERPL-CCNC: 82 U/L (ref 31–125)
ALT SERPL-CCNC: 22 U/L (ref 6–29)
ANION GAP SERPL CALCULATED.4IONS-SCNC: 11 MMOL/L (CALC) (ref 7–17)
AST SERPL-CCNC: 19 U/L (ref 10–30)
BASOPHILS # BLD AUTO: 62 CELLS/UL (ref 0–200)
BASOPHILS NFR BLD AUTO: 0.7 %
BILIRUB SERPL-MCNC: 0.3 MG/DL (ref 0.2–1.2)
BUN SERPL-MCNC: 17 MG/DL (ref 7–25)
CALCIUM SERPL-MCNC: 9.7 MG/DL (ref 8.6–10.2)
CHLORIDE SERPL-SCNC: 102 MMOL/L (ref 98–110)
CO2 SERPL-SCNC: 27 MMOL/L (ref 20–32)
CREAT SERPL-MCNC: 0.74 MG/DL (ref 0.5–0.97)
CRP SERPL-MCNC: <3 MG/L
DHEA-S SERPL-MCNC: 287 MCG/DL (ref 19–237)
EGFRCR SERPLBLD CKD-EPI 2021: 106 ML/MIN/1.73M2
EOSINOPHIL # BLD AUTO: 238 CELLS/UL (ref 15–500)
EOSINOPHIL NFR BLD AUTO: 2.7 %
ERYTHROCYTE [DISTWIDTH] IN BLOOD BY AUTOMATED COUNT: 13.3 % (ref 11–15)
ERYTHROCYTE [SEDIMENTATION RATE] IN BLOOD BY WESTERGREN METHOD: 6 MM/H
ESTROGEN SERPL-MCNC: 29 PG/ML
FSH SERPL-ACNC: 96.2 MIU/ML
GLUCOSE SERPL-MCNC: 97 MG/DL (ref 65–99)
HCT VFR BLD AUTO: 45.2 % (ref 35–45)
HGB BLD-MCNC: 15 G/DL (ref 11.7–15.5)
LH SERPL-ACNC: 37.5 MIU/ML
LYMPHOCYTES # BLD AUTO: 2147 CELLS/UL (ref 850–3900)
LYMPHOCYTES NFR BLD AUTO: 24.4 %
MCH RBC QN AUTO: 32.2 PG (ref 27–33)
MCHC RBC AUTO-ENTMCNC: 33.2 G/DL (ref 32–36)
MCV RBC AUTO: 97 FL (ref 80–100)
MONOCYTES # BLD AUTO: 607 CELLS/UL (ref 200–950)
MONOCYTES NFR BLD AUTO: 6.9 %
NEUTROPHILS # BLD AUTO: 5746 CELLS/UL (ref 1500–7800)
NEUTROPHILS NFR BLD AUTO: 65.3 %
PLATELET # BLD AUTO: 333 THOUSAND/UL (ref 140–400)
PMV BLD REES-ECKER: 9.1 FL (ref 7.5–12.5)
POTASSIUM SERPL-SCNC: 4.2 MMOL/L (ref 3.5–5.3)
PROT SERPL-MCNC: 7.6 G/DL (ref 6.1–8.1)
RBC # BLD AUTO: 4.66 MILLION/UL (ref 3.8–5.1)
SODIUM SERPL-SCNC: 140 MMOL/L (ref 135–146)
TSH SERPL-ACNC: 1.64 MIU/L
WBC # BLD AUTO: 8.8 THOUSAND/UL (ref 3.8–10.8)

## 2025-08-12 ENCOUNTER — APPOINTMENT (OUTPATIENT)
Dept: PRIMARY CARE | Facility: CLINIC | Age: 40
End: 2025-08-12
Payer: COMMERCIAL

## 2025-08-12 VITALS
OXYGEN SATURATION: 98 % | TEMPERATURE: 98 F | DIASTOLIC BLOOD PRESSURE: 84 MMHG | BODY MASS INDEX: 29.38 KG/M2 | HEART RATE: 85 BPM | WEIGHT: 165.8 LBS | HEIGHT: 63 IN | SYSTOLIC BLOOD PRESSURE: 138 MMHG

## 2025-08-12 DIAGNOSIS — E89.40 POST HYSTERECTOMY MENOPAUSE: Primary | ICD-10-CM

## 2025-08-12 DIAGNOSIS — Z90.710 POST HYSTERECTOMY MENOPAUSE: Primary | ICD-10-CM

## 2025-08-12 DIAGNOSIS — J30.2 SEASONAL ALLERGIC RHINITIS, UNSPECIFIED TRIGGER: ICD-10-CM

## 2025-08-12 DIAGNOSIS — R10.84 GENERALIZED ABDOMINAL PAIN: ICD-10-CM

## 2025-08-12 DIAGNOSIS — F41.9 ANXIETY: ICD-10-CM

## 2025-08-12 PROCEDURE — 99214 OFFICE O/P EST MOD 30 MIN: CPT | Performed by: FAMILY MEDICINE

## 2025-08-12 PROCEDURE — 3008F BODY MASS INDEX DOCD: CPT | Performed by: FAMILY MEDICINE

## 2025-08-12 RX ORDER — DULOXETIN HYDROCHLORIDE 20 MG/1
20 CAPSULE, DELAYED RELEASE ORAL DAILY
Qty: 30 CAPSULE | Refills: 1 | Status: SHIPPED | OUTPATIENT
Start: 2025-08-12 | End: 2025-08-14 | Stop reason: SDUPTHER

## 2025-08-12 RX ORDER — CETIRIZINE HYDROCHLORIDE 10 MG/1
10 TABLET ORAL DAILY
Qty: 90 TABLET | Refills: 1 | Status: SHIPPED | OUTPATIENT
Start: 2025-08-12

## 2025-08-12 ASSESSMENT — PATIENT HEALTH QUESTIONNAIRE - PHQ9
SUM OF ALL RESPONSES TO PHQ9 QUESTIONS 1 AND 2: 0
1. LITTLE INTEREST OR PLEASURE IN DOING THINGS: NOT AT ALL
2. FEELING DOWN, DEPRESSED OR HOPELESS: NOT AT ALL

## 2025-08-14 DIAGNOSIS — F41.9 ANXIETY: ICD-10-CM

## 2025-08-14 RX ORDER — DULOXETIN HYDROCHLORIDE 20 MG/1
20 CAPSULE, DELAYED RELEASE ORAL DAILY
Qty: 90 CAPSULE | Refills: 0 | Status: SHIPPED | OUTPATIENT
Start: 2025-08-14 | End: 2026-08-14

## 2025-08-14 RX ORDER — DULOXETIN HYDROCHLORIDE 20 MG/1
20 CAPSULE, DELAYED RELEASE ORAL DAILY
Qty: 30 CAPSULE | Refills: 1 | Status: SHIPPED | OUTPATIENT
Start: 2025-08-14 | End: 2025-08-14 | Stop reason: SDUPTHER

## 2025-10-29 ENCOUNTER — APPOINTMENT (OUTPATIENT)
Dept: PRIMARY CARE | Facility: CLINIC | Age: 40
End: 2025-10-29
Payer: COMMERCIAL